# Patient Record
Sex: MALE | Race: WHITE | NOT HISPANIC OR LATINO | Employment: OTHER | URBAN - METROPOLITAN AREA
[De-identification: names, ages, dates, MRNs, and addresses within clinical notes are randomized per-mention and may not be internally consistent; named-entity substitution may affect disease eponyms.]

---

## 2018-12-04 ENCOUNTER — OFFICE VISIT CONVERTED (OUTPATIENT)
Dept: OTHER | Facility: HOSPITAL | Age: 68
End: 2018-12-04
Attending: INTERNAL MEDICINE

## 2019-06-12 ENCOUNTER — HOSPITAL ENCOUNTER (OUTPATIENT)
Dept: GENERAL RADIOLOGY | Facility: HOSPITAL | Age: 69
Discharge: HOME | End: 2019-06-12

## 2019-07-19 ENCOUNTER — OFFICE VISIT CONVERTED (OUTPATIENT)
Dept: SURGERY | Facility: CLINIC | Age: 69
End: 2019-07-19
Attending: SURGERY

## 2019-08-14 ENCOUNTER — HOSPITAL ENCOUNTER (OUTPATIENT)
Dept: GASTROENTEROLOGY | Facility: HOSPITAL | Age: 69
Setting detail: HOSPITAL OUTPATIENT SURGERY
Discharge: HOME | End: 2019-08-14
Attending: SURGERY

## 2019-09-30 ENCOUNTER — HOSPITAL ENCOUNTER (OUTPATIENT)
Dept: GENERAL RADIOLOGY | Facility: HOSPITAL | Age: 69
Discharge: HOME | End: 2019-09-30
Attending: NURSE PRACTITIONER

## 2019-09-30 LAB
CREAT BLD-MCNC: 1.1 MG/DL (ref 0.6–1.4)
GFR SERPLBLD BASED ON 1.73 SQ M-ARVRAT: >60 ML/MIN/{1.73_M2}

## 2020-02-14 ENCOUNTER — OFFICE VISIT CONVERTED (OUTPATIENT)
Dept: UROLOGY | Facility: CLINIC | Age: 70
End: 2020-02-14
Attending: UROLOGY

## 2021-04-27 ENCOUNTER — OFFICE VISIT CONVERTED (OUTPATIENT)
Dept: SURGERY | Facility: CLINIC | Age: 71
End: 2021-04-27
Attending: NURSE PRACTITIONER

## 2021-05-11 NOTE — H&P
History and Physical      Patient Name: Jake Alvarado   Patient ID: [id number]   Sex: Male   YOB: 1950    Primary Care Provider: NESTOR GALVEZ   Referring Provider: Tyra GALVEZ    Visit Date: April 27, 2021    Provider: LENNY Madrigal   Location: [facility]   Location Address: [address] [place] [address]  [id number]   Location Phone: [phone]          Chief Complaint  · Age 50 or over  · Here today for a pre-surgical colon screening visit  · Personal History of Polyps  · GERD  · Requesting EGD and Colonoscopy      History Of Present Illness  The patient is a 70 year old male presenting to the Surgical Specialist office on a referral from Tyra GALVEZ.   Jake Alvarado needs to have a diagnostic colonoscopy and EGD.   Patient states that they have had a colonoscopy. 2 years ago   Patient currently complains of: GERD   Patient Does not have family history of colon cancer.      Patient presents today on referral from José Miguel Roach for GERD and a screening colonoscopy.  Patient reports that he is with acid reflux despite taking Prilosec.  Patient reports he has been on prior Prilosec for 20 years.  He denies any dysphagia or epigastric pain.  Denies any lower abdominal pain, change in bowel habit, or rectal bleeding.  Denies family history of colorectal cancer.  Admits to history of colonic polyps.    8/19: Colonoscopy (Macario): Descending - tubular adenoma; mid-descending - tubular adenoma; Distal descending - hyperplastic; Rectum - hyperplastic; mid-descending - tubular adenoma; diverticulosis.    Patient does admit to taking Xarelto daily for A. fib and is under the care of Dr. Kammerling.  I will get cardiac clearance prior to the procedure.       Past Medical History  Disease Name Date Onset Notes   Allergic rhinitis, chronic --  --    Arthritis --  --    Asthma --  --    Bladder disorder --  --    Chronic Obstructive Pulmonary Disease  --  --    GERD --  --    Heart Disease --  --    High blood pressure --  --    High cholesterol --  --    Lung disease --  --    Stroke --  --    Ulcer --  --          Past Surgical History  Procedure Name Date Notes   Back --  --    Colon Surgery --  --          Medication List  Name Date Started Instructions   acyclovir 400 mg oral tablet  --    Advair Diskus 250-50 mcg/dose inhalation blister with device  inhale 1 puff by inhalation route 2 times per day in the morning and evening approximately 12 hours apart   Allegra Allergy 60 mg oral tablet  take 1 tablet (60 mg) by oral route 2 times per day   amiodarone 100 mg oral tablet  take 1 tablet (100 mg) by oral route once daily   aspirin 81 mg oral tablet,delayed release (DR/EC)  --    atorvastatin 40 mg oral tablet  take 1 tablet (40 mg) by oral route once daily   benazepril 40 mg oral tablet  take 1 tablet (40 mg) by oral route once daily   diltiazem HCl 180 mg oral capsule,extended release 24 hr  take 1 capsule (180 mg) by oral route once daily   omeprazole 20 mg oral capsule,delayed release(DR/EC)  --    Xarelto 20 mg oral tablet  take 1 tablet (20 mg) by oral route once daily with the evening meal         Allergy List  Allergen Name Date Reaction Notes   NO KNOWN DRUG ALLERGIES --  --  --        Allergies Reconciled  Family Medical History  Disease Name Relative/Age Notes   Diabetes, unspecified type Mother/   Mother  Mother; Brother; Sister   Prostate cancer  Uncle (maternal)   -Father's Family History Unknown Father/   Father         Social History  Finding Status Start/Stop Quantity Notes   Alcohol Current every day --/-- --  drinks daily; liquor   Caffeine Current every day --/-- --  drinks regularly; 3-4 times per day   Second hand smoke exposure Current some day --/-- --  yes   Tobacco Current every day 15/-- 1PPD current everyday smoker; started smoking at age 14; smoked 20 cigarette(s) per day 2/14/2020 - Pt is trying to stop; currently down to 2 or 3  "cig./day         Review of Systems  · Constitutional  o Denies  o : fever, chills  · Eyes  o Denies  o : yellowish discoloration of eyes  · HENT  o Denies  o : difficulty swallowing  · Cardiovascular  o Denies  o : chest pain, chest pain on exertion  · Respiratory  o Denies  o : shortness of breath  · Gastrointestinal  o Admits  o : heartburn, reflux  o Denies  o : nausea, vomiting, diarrhea, constipation  · Genitourinary  o Denies  o : abnormal color of urine  · Integument  o Denies  o : rash  · Neurologic  o Denies  o : tingling or numbness  · Musculoskeletal  o Denies  o : joint pain  · Endocrine  o Denies  o : weight gain, weight loss      Vitals  Date Time BP Position Site L\R Cuff Size HR RR TEMP (F) WT  HT  BMI kg/m2 BSA m2 O2 Sat FR L/min FiO2        04/27/2021 10:14 AM       14  172lbs 6oz 5'  5\" 28.68 1.89             Physical Examination  · Constitutional  o Appearance  o : well developed, well-nourished, patient in no apparent distress  · Head and Face  o Head  o :   § Inspection  § : atraumatic, normocephalic  o Face  o :   § Inspection  § : no facial lesions  · Eyes  o Conjunctivae  o : conjunctivae normal  o Sclerae  o : sclerae white  · Neck  o Inspection/Palpation  o : normal appearance, no masses or tenderness, trachea midline  · Respiratory  o Respiratory Effort  o : breathing unlabored  · Skin and Subcutaneous Tissue  o General Inspection  o : no lesions present, no areas of discoloration, skin turgor normal, texture normal  · Neurologic  o Mental Status Examination  o :   § Orientation  § : grossly oriented to person, place and time  § Attention  § : attention normal, concentration abilities normal  § Fund of Knowledge  § : fund of knowledge within normal limits, patient aware of current events  o Gait and Station  o : normal gait, able to stand without difficulty  · Psychiatric  o Judgement and Insight  o : judgment and insight intact  o Mood and Affect  o : mood normal, affect " appropriate              Assessment  · GERD (gastroesophageal reflux disease)     530.81/K21.9  · Personal history of colonic polyps     V12.72/Z86.010  · Screening for colon cancer     V76.51/Z12.11  · Pre-op testing     V72.84/Z01.818    Problems Reconciled  Plan  · Orders  o Consent for Colonoscopy Screening-Possible risk/complications, benefits, and alternatives to surgical or invasive procedure have been explained to patient and/or legal guardian. -Patient has been evaluated and can tolerate anesthesia and/or sedation. Risks, benefits, and alternatives to anesthesia and sedation have been explained to patient and/or legal guardian. () - 530.81/K21.9, V76.51/Z12.11, V12.72/Z86.010, V72.84/Z01.818 - 05/26/2021  o Consent for Esophagogastroduodenoscopy (EGD) with dilation - Possible risks/complications, benefits, and alternatives to surgical or invasive procedure have been explained to patient and/or legal guardian. - Patient has been evaluated and can tolerate anesthesia and/or sedation. Risks, benefits, and alternatives to anesthesia and sedation have been explained to patient and/or legal guardian. (62063) - 530.81/K21.9, V76.51/Z12.11, V12.72/Z86.010, V72.84/Z01.818 - 05/26/2021  o [facility] [place] (52363[place] - V72.84/Z01.818 - 05/21/2021   [address]  · Medications  o Medications have been Reconciled  o Transition of Care or Provider Policy  · Instructions  o Surgical Facility: [facility]  o Handouts Provided Pre-Procedure Instructions including date, time, and location of procedure.   o PLAN: Proceeed with colonoscopy. Patient understands risks/benefits and is willing to proceed.   o PLAN: Proceeed with EGD. Patient understands risks/benefits and is willing to proceed.   o ***Surgical Orders***  o RISK AND BENEFITS:  o Given these options, the patient has verbally expressed an understanding of the risks of the surgery and finds these risks acceptable. Will proceed with  surgery as soon as possible.  o O.R. PREP: Per protocol   o IV: Per Anesthesia  o Please sign permit for: Colonoscopy with possible biopsies by Dr. Macario.   o Please sign permit for: Esophagogastroduodenoscopy with possible biopsies and dilation by Dr. Macario.   o The above History and Physical Examination has been completed within 30 days of admission.  o ***Patient Status***  o Outpatient  o Follow up in the in the office post procedure.  o I have instructed the patient to hold Xarelto 2 days prior to the procedure or until cardiac clearance.  o Electronically Identified Patient Education Materials Provided Electronically  · Disposition  o EMR dragon/transcription disclaimer: Much of this encounter note is an electronic transcription/translation of spoken language to printed text. Electronic translation of spoken language may permit erroneous, or at times nonsensical words or phrases to be inadvertently trasncribed; although I have reviewed the note for such errors, some may still exist.            Electronically Signed by: LENNY Madrigal -Author on April 27, 2021 11:48:48 AM

## 2021-05-14 VITALS — BODY MASS INDEX: 28.72 KG/M2 | WEIGHT: 172.37 LBS | HEIGHT: 65 IN | RESPIRATION RATE: 14 BRPM

## 2021-05-15 VITALS — RESPIRATION RATE: 16 BRPM | BODY MASS INDEX: 27.99 KG/M2 | HEIGHT: 65 IN | WEIGHT: 168 LBS

## 2021-05-15 VITALS — HEIGHT: 65 IN | BODY MASS INDEX: 27.91 KG/M2 | RESPIRATION RATE: 14 BRPM | WEIGHT: 167.5 LBS

## 2021-05-21 ENCOUNTER — HOSPITAL ENCOUNTER (OUTPATIENT)
Dept: PREADMISSION TESTING | Facility: HOSPITAL | Age: 71
Discharge: HOME | End: 2021-05-21
Attending: SURGERY

## 2021-05-22 LAB — SARS-COV-2 RNA SPEC QL NAA+PROBE: NOT DETECTED

## 2021-05-26 ENCOUNTER — HOSPITAL ENCOUNTER (OUTPATIENT)
Dept: GASTROENTEROLOGY | Facility: HOSPITAL | Age: 71
Setting detail: HOSPITAL OUTPATIENT SURGERY
Discharge: HOME | End: 2021-05-26
Attending: SURGERY

## 2021-05-28 VITALS
SYSTOLIC BLOOD PRESSURE: 153 MMHG | DIASTOLIC BLOOD PRESSURE: 78 MMHG | HEART RATE: 96 BPM | RESPIRATION RATE: 14 BRPM | HEIGHT: 65 IN | OXYGEN SATURATION: 95 % | WEIGHT: 167.12 LBS | TEMPERATURE: 98 F | BODY MASS INDEX: 27.84 KG/M2

## 2021-05-28 NOTE — PROGRESS NOTES
Patient: DIDIER THORNE     Acct: [id number]     Report: #[id number]  UNIT #: [id number]     : 1950    Encounter Date:2018  PRIMARY CARE: Anton Aldana  ***Signed***  --------------------------------------------------------------------------------------------------------------------  Chief Complaint      Encounter Date      Dec 4, 2018            Primary Care Provider      Anton Aldana            Referring Provider      Anton Aldana            Patient Complaint      Patient is complaining of      New pt here for lung nodule            VITALS      Height 5 ft 5 in / 165.1 cm      Weight 167 lbs 2 oz / 75.41215 kg      BSA 1.83 m2      BMI 27.8 kg/m2      Temperature 98.0 F / 36.67 C - Temporal      Pulse 96      Respirations 14      Blood Pressure 153/78 Sitting, Left Arm      Pulse Oximetry 95%, Room air            HPI      The patient is a 68 year old male current every day smoker of cigarettes who was    referred for evaluation of lung nodules. He is currently smoking 1 pack per day     and has done so for 53 years. He has history of hypertension, depression,     carotid artery disease with bilateral carotid endarterectomies. He struggles     from shortness of breath and intermittent coughing and currently takes Advair t    wice daily and this seems to help some. He denies any chronic bronchitis or     recurrent pneumonias. He had a CT scan of the chest performed for low dose lung     cancer screening and was found to have noncalcified 4 mm pulmonary nodule within    the right upper lobe. There was also incidental 4 mm calcification within the     spinal canal at T8 that appeared to be closely associated with the dura     representing a dystrophic dural calcification or a calcified meningioma.             Review of Systems as noted.             Past family, medical, surgical and social histories were all reviewed by myself     with the patient and are unchanged.             Medications were reviewed by myself with the patient and updated in the chart.            ROS      Constitutional:  Denies: Fatigue, Fever, Weight gain, Weight loss, Chills,     Insomnia, Other      Respiratory/Breathing:  Complains of: Shortness of air, Cough; Denies: Wheezing,    Hemoptysis, Pleuritic pain, Other      Endocrine:  Denies: Polydipsia, Polyuria, Heat/cold intolerance, Diabetes, Other      Eyes:  Denies: Blurred vision, Vision Changes, Other      Ears, nose, mouth, throat:  Denies: Mouth lesions, Thrush, Throat pain,     Hoarseness, Allergies/Hay Fever, Post Nasal Drip, Headaches, Recent Head Injury,    Nose Bleeding, Neck Stiffness, Thyroid Mass, Hearing Loss, Ear Fullness, Dry     Mouth, Nasal or Sinus Pain, Dry Lips, Nasal discharge, Nasal congestion, Other      Cardiovascular:  Denies: Palpitations, Syncope, Claudication, Chest Pain, Wake     up Gasping for air, Leg Swelling, Irregular Heart Rate, Cyanosis, Dyspnea on     Exertion, Other      Gastrointestinal:  Denies: Nausea, Constipation, Diarrhea, Abdominal pain,     Vomiting, Difficulty Swallowing, Reflux/Heartburn, Dysphagia, Jaundice,     Bloating, Melena, Bloody stools, Other      Genitourinary:  Denies: Urinary frequency, Incontinence, Hematuria, Urgency,     Nocturia, Dysuria, Testicular problems, Other      Musculoskeletal:  Denies: Joint Pain, Joint Stiffness, Joint Swelling, Myalgias,    Other      Hematologic/lymphatic:  DENIES: Lymphadenopathy, Bruising, Bleeding tendencies,     Other      Neurological:  Denies: Headache, Numbness, Weakness, Seizures, Other      Psychiatric:  Denies: Anxiety, Appropriate Effect, Depression, Other      Sleep:  No: Excessive daytime sleep, Morning Headache?, Snoring, Insomnia?, Stop    breathing at sleep?, Other      Integumentary:  Denies: Rash, Dry skin, Skin Warm to Touch, Other      Immunologic/Allergic:  Denies: Latex allergy, Seasonal allergies, Asthma,     Urticaria, Eczema, Other       Immunization status:  No: Up to date            FAMILY/SOCIAL/MEDICAL HX      Surgical History:  Yes: Carotid Stenosis (CAROTID ENDARDERECTOMY RIGHT),     Orthopedic Surgery (NECK), Spinal Surgery (NECK), Vascular Surgery (right     carotid)      Stroke - Family Hx:  Uncle      Heart - Family Hx:  Mother, Grandparent, Uncle      Diabetes - Family Hx:  Mother, Sister      Cancer/Type - Family Hx:  Other      Other Family Medical History:  Other      Is Father Still Living?:  No      Is Mother Still Living?:  No      Smoking status:  Current every day smoker (1 ppd x 53 years)      Anticoagulation Therapy:  No      Antibiotic Prophylaxis:  No      Medical History:  Yes: Arthritis, Cataracts, Depression, Hemorrhoids/Rectal Prob    (REFLUX), High Blood Pressure, High Cholesterol, Reflux Disease, Stroke; No:     Blood Disease, Chemotherapy/Cancer, Shortness Of Breath      Psychiatric History      Depression            PREVENTION      Hx Influenza Vaccination:  Yes      Date Influenza Vaccine Given:  Nov 1, 2018      Influenza Vaccine Declined:  No      2 or More Falls Past Year?:  No      Fall Past Year with Injury?:  No      Hx Pneumococcal Vaccination:  No      Encouraged to follow-up with:  PCP regarding preventative exams.      Chart initiated by      Mary Nelson MA            ALLERGIES/MEDICATIONS      Allergies:        Coded Allergies:             *No Known Allergies (Verified  Allergy, Unknown, 12/3/18)      Medications    Last Reconciled on 12/4/18 16:35 by AMNA KHAN DO      Tolterodine Tartrate (Detrol LA) 2 Mg Cap.er.24h      2 MG PO QDAY, CAP         Reported         12/4/18       Doxazosin Mesylate (Doxazosin Mesylate) 8 Mg Tablet      8 MG PO QDAY, TAB         Reported         12/4/18       MDI-Advair 250/50 (Advair 250/50 Diskus) 1 Each Blst.w.dev      1 PUFF INH RTBID, #1 INH 0 Refills         Reported         12/4/18       (advair)   No Conflict Check               Prov: AMNA KHAN          12/4/18       Pravastatin Sod (Pravastatin*) 40 Mg Tablet      40 MG PO QDAY, #30 TAB 0 Refills         Reported         12/4/18       Amlodipine/Benazepril Hcl (Amlodipine/Benazepril 5/40 Mg) 1 Each Capsule      1 CAP PO QDAY, #30 CAP         Reported         12/4/18       Omeprazole (PriLOSEC*) 20 Mg Capcr               Reported         4/23/07       Aspirin (Aspirin Low-Strength 81 Mg) 81 Mg Tab               Reported         4/23/07       Acyclovir (Zovirax) 400 Mg Tab               Reported         4/23/07      Current Medications      Current Medications Reviewed 12/3/18            EXAM      Vital signs reviewed.      HEENT: Pupils are equally round and reactive to light and accommodation.      Extraocular muscles intact bilateral. Nares patent without hypertrophy of the     turbinates.  TM's are clear bilaterally. Small oropharynx without lesions or     erythema. There are dentures in place.       NECK:  Supple without tracheal deviation or lymphadenopathy. No thyromegaly     appreciated.       LYMPHATICS: No cervical or supraclavicular lymphadenopathy.       RESPIRATORY: Bibasilar rhonchi, no wheezes or rales, tympanic to percussion.      CVS:  Regular rate and rhythm, no murmurs, rubs or gallops, no lower extremity     edema, , equal radial pulses.      GI: Abdomen soft, nontender, nondistended, no hepatomegaly appreciated.  Bowel     sounds present in all 4 quadrants.       MUSCULOSKELETAL: No erythema, warmth or fluctuance over the major joints     including the knees, ankles, wrists and elbows.        SKIN: No rashes or lesions.       NEUROLOGICAL: Alert and oriented X 3.  No focal deficits on exam. Cranial nerves     II-XII intact bilaterally.       PSYCH: Patient has appropriate mood and affect.      Vtials      Vitals:             Height 5 ft 5 in / 165.1 cm           Weight 167 lbs 2 oz / 75.15417 kg           BSA 1.83 m2           BMI 27.8 kg/m2           Temperature 98.0 F / 36.67 C - Temporal            Pulse 96           Respirations 14           Blood Pressure 153/78 Sitting, Left Arm           Pulse Oximetry 95%, Room air            REVIEW      Results Reviewed      PCCS Results Reviewed?:  Yes Prev Radiology Results      Radiographic Results               [facility] Diagnostic Img                PACS RADIOLOGY REPORT            Patient: DIDIER THORNE   Acct: #[id number]   Report: #[id number]            UNIT #: [id number]    DOS: 18 1015      INSURANCE:MEDICARE PART A   LOCATION:[facility]     : 1950            PROVIDERS      ADMITTING:     ATTENDING: NESTOR HENDERSON      FAMILY:  NONE,MD   ORDERING:  NESTOR HENDERSON         OTHER:    DICTATING:  Ta Robles MD            REQ #:[id number]   EXAM:LDCTCH - LOW DOSE CHEST CT SCREENING      REASON FOR EXAM:        REASON FOR VISIT:  NICOTINE DEP            *******Signed******         PROCEDURE:   CT LOW DOSE CHEST SCREENING             COMPARISON:   [facility], MR, THORACIC SPINE W/O CONT., 2005,     9:30.             REASON FOR SCREENING:   Patient is 68 years of age, asymptomatic, and has a smok    ing history of more       than 30 pack years.      SMOKING STATUS:   Current smoker.                  SCREENING VISIT:   Baseline.                   TECHNIQUE:   Axial unenhanced LDCT images from the apices through mid-kidney     were obtained.       Evaluation of solid organs and vascular structures is suboptimal due to the lack     of IV contrast.       Imaging was performed on a Reza AngelPrime CT scanner.             RADIATION:   CT Dose Index Vol (CTDIvol):    3.085  mGy         Dose Length Product (DLP):    132.2  mGy-cm             DIAGNOSTIC QUALITY:   Satisfactory.               FINDINGS:         There is severe Coronary artery calcification.  There is abberant origin of the     right subclavian       artery seen as an anatomic variant.  There is no mediastinal, hilar,  or axillary     adenopathy.  There       are no pleural effusions.  There is biapical pleural and parenchymal scarring.      Within the right       upper lobe axial image 57 there is a noncalcified 4 mm pulmonary nodule.  No     additional       noncalcified pulmonary nodules are seen.  There is emphysematous change of the     lungs.  There is       minimal linear atelectasis or scarring within the left lower lobe.  Bilateral     adrenal glands are       within normal limits.  Visualized portions of the upper abdomen are otherwise     unremarkable.  There       are degenerative changes of the spine.  No lytic or sclerotic bony lesion     identified.  At the T8       level there is a 4 mm calcification within the spinal canal.  This lies to the     right of midline and       appears to be within or along the wall of the dura.  This could represent a     dystrophic dural       calcification.  Small partially calcified meningioma would also be within the     differential.             There are degenerative changes of the spine.  No lytic or sclerotic bony lesion     identified.      LUNG-RADS CLASSIFICATION:   2-benign appearance are behavior, repeat low-dose     chest CT is recommended       in 12 months?             CONCLUSION:         1. Noncalcified 4 mm pulmonary nodule within the right upper lobe, recommend     repeat low-dose chest       CT in 12 months      2. Incidental 4 mm calcification within the spinal canal at the T8 level.  This     appears to be       closely associated with the dura and may represent a dystrophic dural     calcification or calcified       meningioma.              JALEEL CHASE MD             Electronically Signed and Approved By: JALEEL CHASE MD on 11/13/2018 at 10:52                            Until signed, this is an unconfirmed preliminary report that may contain      errors and is subject to change.                                              STEBA:      D:11/13/18 1052             Assessment      Notes      New Medications      * AMLODIPINE/BENAZEPRIL HCL (Amlodipine/Benazepril 5/40 Mg) 1 EACH CAPSULE: 1       CAP PO QDAY #30      * Pravastatin Sod (Pravastatin*) 40 MG TABLET: 40 MG PO QDAY #30      * (advair):       * MDI-Advair 250/50 (Advair 250/50 Diskus) 1 EACH BLST.W.DEV: 1 PUFF INH RTBID       #1      * Doxazosin Mesylate 8 MG TABLET: 8 MG PO QDAY      * TOLTERODINE TARTRATE (Detrol LA) 2 MG CAP.ER.24H: 2 MG PO QDAY      New Diagnostics      * Chest W/O Cont CT, Year      ASSESSMENT:      1. 4 mm pulmonary nodule right upper lobe.      2. Ongoing tobacco abuse with cigarettes.       3. Dyspnea on exertion and suspect chronic obstructive pulmonary disease.             PLAN:      1.  I counseled the patient on smoking cessation for approximately 3 minutes     today. The patient is currently not willing to try to quit smoking. He says he     enjoys it.       2. I offered pulmonary function tests and six minute walk test to further assess     the patient for chronic obstructive pulmonary disease and he declined.       3.  Repeat CT scan of the chest low dose in 1 year per Fleischner criteria.      4. Continue Advair.      5. Follow up with primary care provider.            Patient Education      Tobacco Cessation Counseling:  for 3 - 10 minutes      Patient Education Provided:  COPD, How to use an Inhaler, Lung Cancer, Smoking     Cessation      Time Spent:  > 50% /Coord [facility]                 Disclaimer: Converted document may not contain table formatting or lab diagrams. Please see [employer] for the authenticated document.

## 2021-06-11 ENCOUNTER — OFFICE VISIT (OUTPATIENT)
Dept: UROLOGY | Facility: CLINIC | Age: 71
End: 2021-06-11

## 2021-06-11 VITALS — RESPIRATION RATE: 17 BRPM | HEIGHT: 65 IN | WEIGHT: 169.6 LBS | BODY MASS INDEX: 28.26 KG/M2

## 2021-06-11 DIAGNOSIS — R35.0 URINARY FREQUENCY: Primary | ICD-10-CM

## 2021-06-11 DIAGNOSIS — R35.0 BENIGN PROSTATIC HYPERPLASIA WITH URINARY FREQUENCY: ICD-10-CM

## 2021-06-11 DIAGNOSIS — R31.9 HEMATURIA, UNSPECIFIED TYPE: ICD-10-CM

## 2021-06-11 DIAGNOSIS — N40.1 BENIGN PROSTATIC HYPERPLASIA WITH URINARY FREQUENCY: ICD-10-CM

## 2021-06-11 LAB
BILIRUB BLD-MCNC: NEGATIVE MG/DL
BILIRUB UR QL STRIP: NEGATIVE
CLARITY UR: CLEAR
CLARITY, POC: CLEAR
COLOR UR: YELLOW
COLOR UR: YELLOW
GLUCOSE UR STRIP-MCNC: NEGATIVE MG/DL
GLUCOSE UR STRIP-MCNC: NEGATIVE MG/DL
HGB UR QL STRIP.AUTO: ABNORMAL
KETONES UR QL STRIP: NEGATIVE
KETONES UR QL: NEGATIVE
LEUKOCYTE EST, POC: NEGATIVE
LEUKOCYTE ESTERASE UR QL STRIP.AUTO: NEGATIVE
NITRITE UR QL STRIP: NEGATIVE
NITRITE UR-MCNC: NEGATIVE MG/ML
PH UR STRIP.AUTO: 6 [PH] (ref 5–8)
PH UR: 6 [PH] (ref 5–8)
PROT UR QL STRIP: NEGATIVE
PROT UR STRIP-MCNC: NEGATIVE MG/DL
RBC # UR STRIP: ABNORMAL /UL
SP GR UR STRIP: 1.01 (ref 1–1.03)
SP GR UR: 1.02 (ref 1–1.03)
SPECIMEN VOL 24H UR: 144 L
UROBILINOGEN UR QL STRIP: ABNORMAL
UROBILINOGEN UR QL: NORMAL

## 2021-06-11 PROCEDURE — 99214 OFFICE O/P EST MOD 30 MIN: CPT | Performed by: UROLOGY

## 2021-06-11 PROCEDURE — 81002 URINALYSIS NONAUTO W/O SCOPE: CPT | Performed by: UROLOGY

## 2021-06-11 PROCEDURE — 81003 URINALYSIS AUTO W/O SCOPE: CPT | Performed by: UROLOGY

## 2021-06-11 RX ORDER — AMIODARONE HYDROCHLORIDE 200 MG/1
100 TABLET ORAL DAILY
COMMUNITY
Start: 2021-04-06

## 2021-06-11 RX ORDER — ASPIRIN 81 MG/1
81 TABLET, COATED ORAL DAILY
COMMUNITY
Start: 2021-06-10

## 2021-06-11 RX ORDER — DILTIAZEM HYDROCHLORIDE 180 MG/1
180 CAPSULE, COATED, EXTENDED RELEASE ORAL DAILY
COMMUNITY
Start: 2021-04-06

## 2021-06-11 RX ORDER — RIVAROXABAN 20 MG/1
20 TABLET, FILM COATED ORAL DAILY
COMMUNITY
Start: 2021-06-10

## 2021-06-11 RX ORDER — ACYCLOVIR 400 MG/1
400 TABLET ORAL NIGHTLY
COMMUNITY
Start: 2021-05-07

## 2021-06-11 RX ORDER — BENAZEPRIL HYDROCHLORIDE 40 MG/1
40 TABLET, FILM COATED ORAL DAILY
COMMUNITY
Start: 2021-04-06

## 2021-06-11 RX ORDER — FEXOFENADINE HYDROCHLORIDE 60 MG/1
60 TABLET, FILM COATED ORAL DAILY
COMMUNITY

## 2021-06-11 RX ORDER — ATORVASTATIN CALCIUM 40 MG/1
40 TABLET, FILM COATED ORAL NIGHTLY
COMMUNITY
Start: 2021-04-06

## 2021-06-11 NOTE — PROGRESS NOTES
Chief Complaint    Urologic complaint    Subjective          Jake Alvarado presents to [facility] UROLOGY  History of Present Illness       70 yo WM with possible BPH, LUTS and freq    Patient stopped doxazosin, he was having bad side effects.  Could not really tell any difference when he came off his medication.    weak stream.  Patient still having a lot of frequency, 12-14 times daily and 1-3 times at night.  Voiding about every hour today time.  No incontinence, no pads. Bothered.    3 to 4 cups of caffeinated coffee daily.  Patient does drink a lot of fluid daily, he also drinks some alcohol in the evening.  No gross hematuria    PVR     6/21    144  2/ 20   70    Results for orders placed or performed in visit on 06/11/21   Bladder Scan   Result Value Ref Range    Volume 144    POC Urinalysis Dipstick    Specimen: Urine   Result Value Ref Range    Color Yellow Yellow, Straw, Dark Yellow, Martine    Clarity, UA Clear Clear    Glucose, UA Negative Negative, 1000 mg/dL (3+) mg/dL    Bilirubin Negative Negative    Ketones, UA Negative Negative    Specific Gravity  1.020 1.005 - 1.030    Blood, UA Large (A) Negative    pH, Urine 6.0 5.0 - 8.0    Protein, POC Negative Negative mg/dL    Urobilinogen, UA Normal Normal    Leukocytes Negative Negative    Nitrite, UA Negative Negative       Previous    Flomax - did not help  tolteradine - did not help    No history of kidney stone.    uncle with prostate CA,   Has never had any urologic surgery.    A fib. No CAD,  smokes.  Xarelto and aspirin 81.  Left pulmonary he is recently diagnosed with cryptogenic pulmonary pneumonia    12/19 creatinine 1.0, GFR greater than 60    PSA      11/18 1.3  5/17 1.0    Past History:  Medical History: has a past medical history of Arthritis, Asthma, Bladder disorder, Chronic allergic rhinitis, Condition not found, COPD (chronic obstructive pulmonary disease) (CMS/[facility]), GERD (gastroesophageal reflux disease), Heart  disease, High blood pressure, High cholesterol, Lung disease, and Stroke (CMS/HCC).   Surgical History: has a past surgical history that includes Back surgery and Colon surgery.   Family History: family history includes Diabetes in his brother, mother, and sister; No Known Problems in his father; Prostate cancer in his maternal uncle.   Social History: reports that he has been smoking. He started smoking about 56 years ago. He has been smoking about 1.00 pack per day. He has never used smokeless tobacco. He reports current alcohol use.  Allergies: Patient has no known allergies.       Current Outpatient Medications:   •  acyclovir (ZOVIRAX) 400 MG tablet, , Disp: , Rfl:   •  amiodarone (PACERONE) 200 MG tablet, , Disp: , Rfl:   •  Aspirin Low Dose 81 MG EC tablet, , Disp: , Rfl:   •  atorvastatin (LIPITOR) 40 MG tablet, , Disp: , Rfl:   •  benazepril (LOTENSIN) 40 MG tablet, , Disp: , Rfl:   •  dilTIAZem CD (CARDIZEM CD) 180 MG 24 hr capsule, , Disp: , Rfl:   •  fexofenadine (Allegra Allergy) 60 MG tablet, Allegra Allergy 60 mg oral tablet take 1 tablet (60 mg) by oral route 2 times per day   Active, Disp: , Rfl:   •  Xarelto 20 MG tablet, , Disp: , Rfl:      Physical exam       Alert and orient x3  Well appearing, well developed, in no acute distress   Unlabored respirations    Grossly oriented to person, place and time, judgment is intact, normal mood and affect    Results for orders placed or performed in visit on 06/11/21   Bladder Scan   Result Value Ref Range    Volume 144    POC Urinalysis Dipstick    Specimen: Urine   Result Value Ref Range    Color Yellow Yellow, Straw, Dark Yellow, Martine    Clarity, UA Clear Clear    Glucose, UA Negative Negative, 1000 mg/dL (3+) mg/dL    Bilirubin Negative Negative    Ketones, UA Negative Negative    Specific Gravity  1.020 1.005 - 1.030    Blood, UA Large (A) Negative    pH, Urine 6.0 5.0 - 8.0    Protein, POC Negative Negative mg/dL    Urobilinogen, UA Normal Normal     "Leukocytes Negative Negative    Nitrite, UA Negative Negative        Objective     Vital Signs:   Resp 17   Ht 165.1 cm (65\")   Wt 76.9 kg (169 lb 9.6 oz)   BMI 28.22 kg/m²              Assessment and Plan    Diagnoses and all orders for this visit:    1. Urinary frequency (Primary)  -     POC Urinalysis Dipstick    2. Benign prostatic hyperplasia with urinary frequency    Other orders  -     Bladder Scan    Acquired recent labs from PCP including PSA    Patient with urinary symptoms that have not gotten better he is also positive for large blood on UA today.  I did recommend office cystoscopy.  I will get him in for this in the next month or so.  Risk/benefits and side effect discussed today    UA with micro today, discussed with the patient that if he does have microscopic hematuria documented on this I would call him and discuss getting a CT scan to rule out underlying malignancy.  Patient voiced understanding    Patient is drinking a lot of fluid and some of which is caffeine/alcohol we discussed decreasing this may get his symptoms where he may not have anything done.  Patient voiced understanding when to try to decrease his fluid intake and decrease caffeine over the next month or so.    Follow-up for office cystoscopy      "

## 2021-08-20 PROBLEM — R31.29 MICROHEMATURIA: Status: ACTIVE | Noted: 2021-08-20

## 2021-08-20 NOTE — PROGRESS NOTES
Procedures       Urinalysis was checked today and was negative for signs of infection      Cytoscopy Procedure:     Procedure: Flexible cytoscope was passed per urethra into the bladder without difficulty after proper consent. The bladder was inspected in a systematic meridian fashion.     2.5 cm prostate    Moderate trabeculations with some very shallow bordering on diverticulum posteriorly in the dome    There were no tumors, lesions, stones, or other abnormalities noted within the bladder. Of note, there was no increased vascularity as well. Both ureteral orifices were identified and were normal in appearance. The flexible cytoscope was removed. The patient tolerated the procedure well.         External records reviewed    3/21 creatinine 1.24, GFR 58, PSA 1.1        PLAN      BPH    Cannot tolerate meds.  Patient on Xarelto and aspirin 81  When he comes back and we will see how he is doing we could consider procedures or following him conservatively.    Microhematuria      After discussion of risk and benefits we will Get him set up for CT urology protocol having follow-up after            This document has been electronically signed by Mervin Leroy MD  August 20, 2021 06:56 EDT

## 2021-08-23 ENCOUNTER — OFFICE VISIT (OUTPATIENT)
Dept: UROLOGY | Facility: CLINIC | Age: 71
End: 2021-08-23

## 2021-08-23 DIAGNOSIS — R31.29 MICROHEMATURIA: ICD-10-CM

## 2021-08-23 DIAGNOSIS — N40.1 BENIGN PROSTATIC HYPERPLASIA WITH URINARY FREQUENCY: Primary | ICD-10-CM

## 2021-08-23 DIAGNOSIS — R35.0 BENIGN PROSTATIC HYPERPLASIA WITH URINARY FREQUENCY: Primary | ICD-10-CM

## 2021-08-23 PROCEDURE — 52000 CYSTOURETHROSCOPY: CPT | Performed by: UROLOGY

## 2021-10-01 ENCOUNTER — HOSPITAL ENCOUNTER (OUTPATIENT)
Dept: CT IMAGING | Facility: HOSPITAL | Age: 71
Discharge: HOME | End: 2021-10-01
Admitting: UROLOGY

## 2021-10-01 DIAGNOSIS — R35.0 BENIGN PROSTATIC HYPERPLASIA WITH URINARY FREQUENCY: ICD-10-CM

## 2021-10-01 DIAGNOSIS — R31.29 MICROHEMATURIA: ICD-10-CM

## 2021-10-01 DIAGNOSIS — N40.1 BENIGN PROSTATIC HYPERPLASIA WITH URINARY FREQUENCY: ICD-10-CM

## 2021-10-01 LAB — CREAT BLDA-MCNC: 1.3 MG/DL

## 2021-10-01 PROCEDURE — 82565 ASSAY OF CREATININE: CPT

## 2021-10-01 PROCEDURE — 74178 CT ABD&PLV WO CNTR FLWD CNTR: CPT

## 2021-10-01 PROCEDURE — 0 IOPAMIDOL PER 1 ML: Performed by: UROLOGY

## 2021-10-01 RX ADMIN — IOPAMIDOL 100 ML: 755 INJECTION, SOLUTION INTRAVENOUS at 10:39

## 2021-10-05 NOTE — PROGRESS NOTES
Chief Complaint    Urologic complaint    Subjective          Jake Alvarado presents to [facility] UROLOGY  History of Present Illness       70 yo WM with possible BPH, LUTS and freq/microhematuria    10/1/2021 CT urogram-prostatomegaly, diverticulosis, recommend colonoscopy, no  findings, severe arthrosclerosis in the abdominal aorta and iliac vascular distributions.  AAA measuring 3 cm in transverse dimension.  Delayed phase okay    8/21 cystoscopy-2.5 cm prostate, moderate trabeculations with some very shallow diverticulum posteriorly on the dome.  Negative otherwise.    Patient has had recent colonoscopy    weak stream at times.  Patient still having a lot of frequency, 12-14 times daily and 1-3 times at night.  Voiding about every hour today time.  No incontinence, no pads. Bothered.    Previous    doxazosin -   Could not really tell any difference when he came off his medication. Had side effects.    3 to 4 cups of caffeinated coffee daily.  Patient does drink a lot of fluid daily, he also drinks some alcohol in the evening.      PVR     6/21    144  2/ 20   70    Results for orders placed or performed during the hospital encounter of 10/01/21   POC Creatinine    Specimen: Blood   Result Value Ref Range    Creatinine 1.30 mg/dL    GFR MDRD       GFR MDRD Non African American 54 mL/min/1.73 sq.M       Flomax - did not help  tolteradine - did not help    No history of kidney stone.    uncle with prostate CA,   Has never had any urologic surgery.    A fib. No CAD,  smokes.  Xarelto and aspirin 81.  Left pulmonary he is recently diagnosed with cryptogenic pulmonary pneumonia    12/19 creatinine 1.0, GFR greater than 60    PSA    3/21  1.1  11/18 1.3  5/17 1.0    Past History:  Medical History: has a past medical history of Arthritis, Asthma, Bladder disorder, Chronic allergic rhinitis, Condition not found, COPD (chronic obstructive pulmonary disease) (CMS/[facility]), GERD  (gastroesophageal reflux disease), Heart disease, High blood pressure, High cholesterol, Lung disease, and Stroke (CMS/HCC).   Surgical History: has a past surgical history that includes Back surgery and Colon surgery.   Family History: family history includes Diabetes in his brother, mother, and sister; No Known Problems in his father; Prostate cancer in his maternal uncle.   Social History: reports that he has been smoking. He started smoking about 56 years ago. He has been smoking about 1.00 pack per day. He has never used smokeless tobacco. He reports current alcohol use.  Allergies: Patient has no known allergies.       Current Outpatient Medications:   •  acyclovir (ZOVIRAX) 400 MG tablet, , Disp: , Rfl:   •  amiodarone (PACERONE) 200 MG tablet, , Disp: , Rfl:   •  Aspirin Low Dose 81 MG EC tablet, , Disp: , Rfl:   •  atorvastatin (LIPITOR) 40 MG tablet, , Disp: , Rfl:   •  benazepril (LOTENSIN) 40 MG tablet, , Disp: , Rfl:   •  dilTIAZem CD (CARDIZEM CD) 180 MG 24 hr capsule, , Disp: , Rfl:   •  fexofenadine (Allegra Allergy) 60 MG tablet, Allegra Allergy 60 mg oral tablet take 1 tablet (60 mg) by oral route 2 times per day   Active, Disp: , Rfl:   •  Xarelto 20 MG tablet, , Disp: , Rfl:      Physical exam       Alert and orient x3  Well appearing, well developed, in no acute distress   Unlabored respirations    Grossly oriented to person, place and time, judgment is intact, normal mood and affect    Results for orders placed or performed during the hospital encounter of 10/01/21   POC Creatinine    Specimen: Blood   Result Value Ref Range    Creatinine 1.30 mg/dL    GFR MDRD       GFR MDRD Non African American 54 mL/min/1.73 sq.M        Objective     Vital Signs:   There were no vitals taken for this visit.             Assessment and Plan    Diagnoses and all orders for this visit:    1. Microhematuria (Primary)    2. Benign prostatic hyperplasia with urinary frequency    BPH     Patient has  had trouble with alpha blockers, after discussion of risk and benefits we will place him on finasteride 5 mg daily.    Patient is on Xarelto, we discussed this with risk and benefits of TURP.  At this time he would like to hold off on any procedures       Microhematuria    Hematuria work-up negative.  Patient given reassurance.

## 2021-10-08 ENCOUNTER — OFFICE VISIT (OUTPATIENT)
Dept: UROLOGY | Facility: CLINIC | Age: 71
End: 2021-10-08

## 2021-10-08 VITALS — WEIGHT: 169 LBS | HEIGHT: 65 IN | RESPIRATION RATE: 17 BRPM | BODY MASS INDEX: 28.16 KG/M2

## 2021-10-08 DIAGNOSIS — R31.29 MICROHEMATURIA: Primary | ICD-10-CM

## 2021-10-08 DIAGNOSIS — N40.1 BENIGN PROSTATIC HYPERPLASIA WITH URINARY FREQUENCY: ICD-10-CM

## 2021-10-08 DIAGNOSIS — R35.0 BENIGN PROSTATIC HYPERPLASIA WITH URINARY FREQUENCY: ICD-10-CM

## 2021-10-08 PROCEDURE — 99214 OFFICE O/P EST MOD 30 MIN: CPT | Performed by: UROLOGY

## 2021-10-08 RX ORDER — FINASTERIDE 5 MG/1
5 TABLET, FILM COATED ORAL DAILY
Qty: 90 TABLET | Refills: 4 | Status: SHIPPED | OUTPATIENT
Start: 2021-10-08 | End: 2023-01-04

## 2022-08-11 ENCOUNTER — TELEPHONE (OUTPATIENT)
Dept: NEUROLOGY | Facility: CLINIC | Age: 72
End: 2022-08-11

## 2022-08-11 NOTE — TELEPHONE ENCOUNTER
Josselin from the [facility] called and is requesting a 7 day hold on patient's aspirin for surgery.  Please call her back [phone] thank you

## 2022-10-17 ENCOUNTER — OFFICE VISIT (OUTPATIENT)
Dept: SURGERY | Facility: CLINIC | Age: 72
End: 2022-10-17

## 2022-10-17 ENCOUNTER — PREP FOR SURGERY (OUTPATIENT)
Dept: OTHER | Facility: HOSPITAL | Age: 72
End: 2022-10-17

## 2022-10-17 VITALS — BODY MASS INDEX: 29.52 KG/M2 | HEIGHT: 65 IN | WEIGHT: 177.2 LBS | RESPIRATION RATE: 16 BRPM

## 2022-10-17 DIAGNOSIS — Z86.010 PERSONAL HISTORY OF COLONIC POLYPS: Primary | ICD-10-CM

## 2022-10-17 PROBLEM — Z86.0100 PERSONAL HISTORY OF COLONIC POLYPS: Status: ACTIVE | Noted: 2022-10-17

## 2022-10-17 PROCEDURE — S0260 H&P FOR SURGERY: HCPCS | Performed by: SURGERY

## 2022-10-17 RX ORDER — HYDRALAZINE HYDROCHLORIDE 50 MG/1
75 TABLET, FILM COATED ORAL
COMMUNITY

## 2022-10-17 RX ORDER — FLUTICASONE PROPIONATE 50 MCG
2 SPRAY, SUSPENSION (ML) NASAL DAILY
COMMUNITY
End: 2023-01-04

## 2022-10-17 RX ORDER — DICYCLOMINE HYDROCHLORIDE 10 MG/1
10 CAPSULE ORAL 2 TIMES DAILY
COMMUNITY

## 2022-10-17 RX ORDER — OMEPRAZOLE 20 MG/1
20 CAPSULE, DELAYED RELEASE ORAL 2 TIMES DAILY
COMMUNITY

## 2022-10-17 NOTE — PROGRESS NOTES
Chief Complaint:  Colonoscopy (Consult)    Primary Care Provider: Philomena Copeland APRN    Referring Provider:  Self referral    History of Present Illness  Jake Alvarado is a 72 y.o. male who was on a callback list to have a colonoscopy.  The patient had a colonoscopy by me on 5/26/21 for surveillance after the patient had a colonoscopy in 2019 and 5 polyps were removed.  During the colonoscopy in May 2021, I removed tubular adenoma that was about 20 to 25 mm size and located 20 cm from the anus.  The colon was tattooed just distal to the polyp and one Endo Clip was placed.  Patient was instructed to get a colonoscopy one year later and he is here now to get that arranged.  Patient takes Xarelto for atrial fibrillation.  He has stopped taking it before to have procedures performed.    Allergies: Patient has no known allergies.    Outpatient Medications Marked as Taking for the 10/17/22 encounter (Office Visit) with Chuck Macario MD   Medication Sig Dispense Refill   • acyclovir (ZOVIRAX) 400 MG tablet      • amiodarone (PACERONE) 200 MG tablet      • Aspirin Low Dose 81 MG EC tablet      • atorvastatin (LIPITOR) 40 MG tablet      • benazepril (LOTENSIN) 40 MG tablet      • dicyclomine (BENTYL) 10 MG capsule Take 1 capsule by mouth 3 (Three) Times a Day.     • dilTIAZem CD (CARDIZEM CD) 180 MG 24 hr capsule      • fexofenadine (ALLEGRA) 60 MG tablet Allegra Allergy 60 mg oral tablet take 1 tablet (60 mg) by oral route 2 times per day   Active     • finasteride (PROSCAR) 5 MG tablet Take 1 tablet by mouth Daily. 90 tablet 4   • fluticasone (FLONASE) 50 MCG/ACT nasal spray 2 sprays into the nostril(s) as directed by provider Daily.     • Fluticasone-Umeclidin-Vilant (Trelegy Ellipta) 100-62.5-25 MCG/INH inhaler Inhale 1 puff Daily.     • hydrALAZINE (APRESOLINE) 50 MG tablet Take 1.5 tablets by mouth 3 (Three) Times a Day.     • ipratropium-albuterol (COMBIVENT RESPIMAT)  MCG/ACT inhaler Inhale 1  "puff 4 (Four) Times a Day As Needed for Wheezing.     • omeprazole (priLOSEC) 20 MG capsule Take 1 capsule by mouth 2 (Two) Times a Day.     • Xarelto 20 MG tablet          Past Medical History:   • Arthritis   • Asthma   • Bladder disorder   • Chronic allergic rhinitis   • Condition not found    Ulcer   • COPD (chronic obstructive pulmonary disease) ([facility])   • GERD (gastroesophageal reflux disease)   • Heart disease   • High blood pressure   • High cholesterol   • Lung disease   • Stroke (HCC)        Past Surgical History:   • BACK SURGERY   • COLON SURGERY       Family History:   Family History   Problem Relation Age of Onset   • Diabetes Mother    • No Known Problems Father    • Diabetes Sister    • Diabetes Brother    • Prostate cancer Maternal Uncle         Social History:  Social History     Tobacco Use   • Smoking status: Every Day     Packs/day: 1.00     Types: Cigarettes     Start date: 1965   • Smokeless tobacco: Never   • Tobacco comments:     Pt is trying to stop; currently down to 2 or 3 cig./day   Substance Use Topics   • Alcohol use: Yes     Comment: Current every day  drinks daily; liqour       Objective     Vital Signs:  Resp 16   Ht 165.1 cm (65\")   Wt 80.4 kg (177 lb 3.2 oz)   BMI 29.49 kg/m²   • Constitutional: alert, no acute distress, reliable historian  • HENT:  NCAT, no visible deformities or lesions  • Eyes:  sclerae clear, conjunctivae clear, EOMI  • Neck:  normal appearance, no masses, trachea midline  • Respiratory:  breathing not labored, respiratory effort appears normal  • Cardiovascular:  heart regular rate  • Abdomen:  nondistended    • Skin and subcutaneous tissue:  no visible concerning rashes or lesions, no jaundice  • Musculoskeletal: moving all extremities symmetrically and purposefully  • Neurologic:  no obvious motor or sensory deficits, normal gait, able to stand without difficulty, cerebellar function without any obvious abnormalities, alert & oriented x 3, speech " clear  • Psychiatric:  judgment and insight intact, mood normal, affect appropriate, cooperative      Assessment:  Personal history of colonic polyps    Plan:  Colonoscopy    Discussion: Indications, options, risks, benefits, and expected outcomes of planned surgery were discussed with the patient and he agrees to proceed.  Patient was instructed to stop taking Xarelto 2 days before the day he has the colonoscopy.    Chuck Macario MD  10/17/2022    Electronically signed by Chuck Macario MD, 10/17/22, 10:39 AM EDT.

## 2022-10-26 ENCOUNTER — TELEPHONE (OUTPATIENT)
Dept: UROLOGY | Facility: CLINIC | Age: 72
End: 2022-10-26

## 2022-10-26 NOTE — TELEPHONE ENCOUNTER
Patient agreed to moving his appt from 10/28 to 12/02 at 1000. He said he is doing well and does not need any refills.

## 2022-11-30 PROBLEM — N40.1 BENIGN PROSTATIC HYPERPLASIA WITH LOWER URINARY TRACT SYMPTOMS: Status: ACTIVE | Noted: 2022-11-30

## 2022-12-02 ENCOUNTER — OFFICE VISIT (OUTPATIENT)
Dept: UROLOGY | Facility: CLINIC | Age: 72
End: 2022-12-02

## 2022-12-02 VITALS — RESPIRATION RATE: 18 BRPM

## 2022-12-02 DIAGNOSIS — R31.29 MICROHEMATURIA: ICD-10-CM

## 2022-12-02 DIAGNOSIS — N40.1 BENIGN PROSTATIC HYPERPLASIA WITH LOWER URINARY TRACT SYMPTOMS, SYMPTOM DETAILS UNSPECIFIED: Primary | ICD-10-CM

## 2022-12-02 LAB
BACTERIA UR QL AUTO: NORMAL /HPF
BILIRUB BLD-MCNC: NEGATIVE MG/DL
BILIRUB UR QL STRIP: NEGATIVE
CLARITY UR: CLEAR
CLARITY, POC: CLEAR
COLOR UR: YELLOW
COLOR UR: YELLOW
EXPIRATION DATE: ABNORMAL
GLUCOSE UR STRIP-MCNC: NEGATIVE MG/DL
GLUCOSE UR STRIP-MCNC: NEGATIVE MG/DL
HGB UR QL STRIP.AUTO: NEGATIVE
HYALINE CASTS UR QL AUTO: NORMAL /LPF
KETONES UR QL STRIP: NEGATIVE
KETONES UR QL: NEGATIVE
LEUKOCYTE EST, POC: NEGATIVE
LEUKOCYTE ESTERASE UR QL STRIP.AUTO: NEGATIVE
Lab: ABNORMAL
NITRITE UR QL STRIP: NEGATIVE
NITRITE UR-MCNC: NEGATIVE MG/ML
PH UR STRIP.AUTO: 6 [PH] (ref 5–8)
PH UR: 6 [PH] (ref 5–8)
PROT UR QL STRIP: NEGATIVE
PROT UR STRIP-MCNC: NEGATIVE MG/DL
RBC # UR STRIP: ABNORMAL /UL
RBC # UR STRIP: NORMAL /HPF
REF LAB TEST METHOD: NORMAL
SP GR UR STRIP: 1.01 (ref 1–1.03)
SP GR UR: 1.01 (ref 1–1.03)
SPECIMEN VOL 24H UR: 178 L
SQUAMOUS #/AREA URNS HPF: NORMAL /HPF
UROBILINOGEN UR QL STRIP: NORMAL
UROBILINOGEN UR QL: ABNORMAL
WBC # UR STRIP: NORMAL /HPF

## 2022-12-02 PROCEDURE — 99214 OFFICE O/P EST MOD 30 MIN: CPT | Performed by: UROLOGY

## 2022-12-02 PROCEDURE — 81003 URINALYSIS AUTO W/O SCOPE: CPT | Performed by: UROLOGY

## 2022-12-02 PROCEDURE — 51798 US URINE CAPACITY MEASURE: CPT | Performed by: UROLOGY

## 2022-12-02 PROCEDURE — 81001 URINALYSIS AUTO W/SCOPE: CPT | Performed by: UROLOGY

## 2022-12-06 ENCOUNTER — TRANSCRIBE ORDERS (OUTPATIENT)
Dept: ADMINISTRATIVE | Facility: HOSPITAL | Age: 72
End: 2022-12-06

## 2022-12-06 DIAGNOSIS — R51.9 FREQUENT HEADACHES: Primary | ICD-10-CM

## 2022-12-19 ENCOUNTER — HOSPITAL ENCOUNTER (OUTPATIENT)
Dept: MRI IMAGING | Facility: HOSPITAL | Age: 72
Discharge: HOME | End: 2022-12-19
Admitting: NURSE PRACTITIONER

## 2022-12-19 DIAGNOSIS — R51.9 FREQUENT HEADACHES: ICD-10-CM

## 2022-12-19 PROCEDURE — 70551 MRI BRAIN STEM W/O DYE: CPT

## 2023-01-04 RX ORDER — ALBUTEROL SULFATE 90 UG/1
2 AEROSOL, METERED RESPIRATORY (INHALATION) EVERY 4 HOURS PRN
COMMUNITY

## 2023-01-04 RX ORDER — FLUTICASONE PROPIONATE 50 MCG
2 SPRAY, SUSPENSION (ML) NASAL AS NEEDED
COMMUNITY

## 2023-01-04 NOTE — PRE-PROCEDURE INSTRUCTIONS
PT INSTRUCTED ON CLEAR LIQ DIET AND PO SPLIT PREP LAST BM SHOULD BE CLEAR  PT CAN TAKE  Amiodarone, apresoline, cardizem  WITH A SIP OF WATER IN THE AM OF THE PROCEDURE ARRIVAL TIME IS 0730 am ON 1/11/2023

## 2023-01-11 ENCOUNTER — HOSPITAL ENCOUNTER (OUTPATIENT)
Facility: HOSPITAL | Age: 73
Setting detail: HOSPITAL OUTPATIENT SURGERY
Discharge: HOME | End: 2023-01-11
Attending: SURGERY | Admitting: SURGERY
Payer: MEDICARE

## 2023-01-11 ENCOUNTER — ANESTHESIA EVENT (OUTPATIENT)
Dept: GASTROENTEROLOGY | Facility: HOSPITAL | Age: 73
End: 2023-01-11
Payer: MEDICARE

## 2023-01-11 ENCOUNTER — ANESTHESIA (OUTPATIENT)
Dept: GASTROENTEROLOGY | Facility: HOSPITAL | Age: 73
End: 2023-01-11
Payer: MEDICARE

## 2023-01-11 VITALS
DIASTOLIC BLOOD PRESSURE: 49 MMHG | OXYGEN SATURATION: 90 % | SYSTOLIC BLOOD PRESSURE: 94 MMHG | RESPIRATION RATE: 16 BRPM | WEIGHT: 169.75 LBS | HEIGHT: 65 IN | BODY MASS INDEX: 28.28 KG/M2 | HEART RATE: 65 BPM | TEMPERATURE: 98.7 F

## 2023-01-11 DIAGNOSIS — Z86.010 PERSONAL HISTORY OF COLONIC POLYPS: ICD-10-CM

## 2023-01-11 PROBLEM — Z86.0100 PERSONAL HISTORY OF COLONIC POLYPS: Status: RESOLVED | Noted: 2022-10-17 | Resolved: 2023-01-11

## 2023-01-11 PROCEDURE — 25010000002 PROPOFOL 10 MG/ML EMULSION: Performed by: NURSE ANESTHETIST, CERTIFIED REGISTERED

## 2023-01-11 PROCEDURE — 88305 TISSUE EXAM BY PATHOLOGIST: CPT | Performed by: SURGERY

## 2023-01-11 DEVICE — DEV CLIP HEMO RESOLUTION360/ULTR 235CM 17MM STRL: Type: IMPLANTABLE DEVICE | Site: RECTUM | Status: FUNCTIONAL

## 2023-01-11 DEVICE — DEV CLIP ENDO RESOLUTION360 CONTRL ROT 235CM: Type: IMPLANTABLE DEVICE | Site: RECTUM | Status: FUNCTIONAL

## 2023-01-11 RX ORDER — SODIUM CHLORIDE, SODIUM LACTATE, POTASSIUM CHLORIDE, CALCIUM CHLORIDE 600; 310; 30; 20 MG/100ML; MG/100ML; MG/100ML; MG/100ML
1000 INJECTION, SOLUTION INTRAVENOUS CONTINUOUS
Status: DISCONTINUED | OUTPATIENT
Start: 2023-01-11 | End: 2023-01-11 | Stop reason: HOSPADM

## 2023-01-11 RX ORDER — LIDOCAINE HYDROCHLORIDE 20 MG/ML
INJECTION, SOLUTION EPIDURAL; INFILTRATION; INTRACAUDAL; PERINEURAL AS NEEDED
Status: DISCONTINUED | OUTPATIENT
Start: 2023-01-11 | End: 2023-01-11 | Stop reason: SURG

## 2023-01-11 RX ADMIN — SODIUM CHLORIDE, POTASSIUM CHLORIDE, SODIUM LACTATE AND CALCIUM CHLORIDE: 600; 310; 30; 20 INJECTION, SOLUTION INTRAVENOUS at 08:34

## 2023-01-11 RX ADMIN — LIDOCAINE HYDROCHLORIDE 40 MG: 20 INJECTION, SOLUTION EPIDURAL; INFILTRATION; INTRACAUDAL; PERINEURAL at 08:45

## 2023-01-11 RX ADMIN — PROPOFOL 250 MCG/KG/MIN: 10 INJECTION, EMULSION INTRAVENOUS at 08:35

## 2023-01-11 NOTE — H&P
Chief Complaint:  Colonoscopy (Consult)    Primary Care Provider: Philomena Copeland APRN    Referring Provider:  Self referral    History of Present Illness  Jake Alvarado is a 72 y.o. male who was on a callback list to have a colonoscopy.  The patient had a colonoscopy by me on 5/26/21 for surveillance after the patient had a colonoscopy in 2019 and 5 polyps were removed.  During the colonoscopy in May 2021, I removed tubular adenoma that was about 20 to 25 mm size and located 20 cm from the anus.  The colon was tattooed just distal to the polyp and one Endo Clip was placed.  Patient was instructed to get a colonoscopy one year later and that is the reason for today's colonoscopy.  Patient takes Xarelto for atrial fibrillation.  He has stopped taking it before to have procedures performed.    Allergies: Patient has no known allergies.    Outpatient Medications Marked as Taking for the 10/17/22 encounter (Office Visit) with Chuck Macario MD   Medication Sig Dispense Refill   • acyclovir (ZOVIRAX) 400 MG tablet      • amiodarone (PACERONE) 200 MG tablet      • Aspirin Low Dose 81 MG EC tablet      • atorvastatin (LIPITOR) 40 MG tablet      • benazepril (LOTENSIN) 40 MG tablet      • dicyclomine (BENTYL) 10 MG capsule Take 1 capsule by mouth 3 (Three) Times a Day.     • dilTIAZem CD (CARDIZEM CD) 180 MG 24 hr capsule      • fexofenadine (ALLEGRA) 60 MG tablet Allegra Allergy 60 mg oral tablet take 1 tablet (60 mg) by oral route 2 times per day   Active     • finasteride (PROSCAR) 5 MG tablet Take 1 tablet by mouth Daily. 90 tablet 4   • fluticasone (FLONASE) 50 MCG/ACT nasal spray 2 sprays into the nostril(s) as directed by provider Daily.     • Fluticasone-Umeclidin-Vilant (Trelegy Ellipta) 100-62.5-25 MCG/INH inhaler Inhale 1 puff Daily.     • hydrALAZINE (APRESOLINE) 50 MG tablet Take 1.5 tablets by mouth 3 (Three) Times a Day.     • ipratropium-albuterol (COMBIVENT RESPIMAT)  MCG/ACT inhaler  "Inhale 1 puff 4 (Four) Times a Day As Needed for Wheezing.     • omeprazole (priLOSEC) 20 MG capsule Take 1 capsule by mouth 2 (Two) Times a Day.     • Xarelto 20 MG tablet          Past Medical History:   • Arthritis   • Asthma   • Bladder disorder   • Chronic allergic rhinitis   • Condition not found    Ulcer   • COPD (chronic obstructive pulmonary disease) ([facility])   • GERD (gastroesophageal reflux disease)   • Heart disease   • High blood pressure   • High cholesterol   • Lung disease   • Stroke (HCC)        Past Surgical History:   • BACK SURGERY   • COLON SURGERY       Family History:   Family History   Problem Relation Age of Onset   • Diabetes Mother    • No Known Problems Father    • Diabetes Sister    • Diabetes Brother    • Prostate cancer Maternal Uncle         Social History:  Social History     Tobacco Use   • Smoking status: Every Day     Packs/day: 1.00     Types: Cigarettes     Start date: 1965   • Smokeless tobacco: Never   • Tobacco comments:     Pt is trying to stop; currently down to 2 or 3 cig./day   Substance Use Topics   • Alcohol use: Yes     Comment: Current every day  drinks daily; liqour       Objective     Vital Signs:  Resp 16   Ht 165.1 cm (65\")   Wt 80.4 kg (177 lb 3.2 oz)   BMI 29.49 kg/m²   • Respiratory:  breathing not labored, respiratory effort appears normal  • Cardiovascular:  heart regular rate  • Musculoskeletal: moving all extremities symmetrically and purposefully  Neurologic:  no obvious motor or sensory deficits, alert & oriented      Assessment:  Personal history of colonic polyps    Plan:  Colonoscopy    Discussion: Indications, options, risks, benefits, and expected outcomes of planned surgery were discussed with the patient and he agrees to proceed.    Chuck Macario MD  1/11/23    Electronically signed by Chuck Macario MD, 01/11/23, 7:14 AM EST.  "

## 2023-01-11 NOTE — ANESTHESIA PREPROCEDURE EVALUATION
Anesthesia Evaluation     Patient summary reviewed and Nursing notes reviewed   no history of anesthetic complications:  NPO Solid Status: > 8 hours  NPO Liquid Status: > 2 hours           Airway   Mallampati: II  TM distance: >3 FB  Neck ROM: full  No difficulty expected  Dental    (+) edentulous    Pulmonary - normal exam    breath sounds clear to auscultation  (+) COPD, asthma,  Cardiovascular   Exercise tolerance: good (4-7 METS)    Rhythm: irregular  Rate: normal    (+) hypertension, dysrhythmias Atrial Fib, hyperlipidemia,       Neuro/Psych  (+) CVA,    GI/Hepatic/Renal/Endo    (+)  GERD,      Musculoskeletal     Abdominal    Substance History - negative use     OB/GYN negative ob/gyn ROS         Other   arthritis,      ROS/Med Hx Other: PAT Nursing Notes unavailable.                   Anesthesia Plan    ASA 3     general     (Total IV Anesthesia    Patient understands anesthesia not responsible for dental damage.  )  intravenous induction     Anesthetic plan, risks, benefits, and alternatives have been provided, discussed and informed consent has been obtained with: patient.    Plan discussed with CRNA.        CODE STATUS:

## 2023-01-11 NOTE — ANESTHESIA POSTPROCEDURE EVALUATION
Patient: Jake Alvarado    Procedure Summary     Date: 01/11/23 Room / Location: [facility] ENDOSCOPY 1 / [facility] ENDOSCOPY    Anesthesia Start: 0834 Anesthesia Stop: 0913    Procedure: COLONOSCOPY WITH POLYPECTOMIES HOT AND COLD SNARE, CLIP APPLICATION X3 Diagnosis:       Personal history of colonic polyps      (Personal history of colonic polyps [Z86.010])    Surgeons: Chuck Macario MD Provider: Kurt Claire MD    Anesthesia Type: general ASA Status: 3          Anesthesia Type: general    Vitals  Vitals Value Taken Time   BP 94/49 01/11/23 0925   Temp 37.1 °C (98.7 °F) 01/11/23 0925   Pulse 68 01/11/23 0927   Resp 16 01/11/23 0925   SpO2 92 % 01/11/23 0927   Vitals shown include unvalidated device data.        Post Anesthesia Care and Evaluation    Patient location during evaluation: bedside  Patient participation: complete - patient participated  Level of consciousness: awake  Pain management: adequate    Airway patency: patent  Anesthetic complications: No anesthetic complications  PONV Status: none  Cardiovascular status: acceptable and stable  Respiratory status: acceptable  Hydration status: acceptable    Comments: An Anesthesiologist personally participated in the most demanding procedures (including induction and emergence if applicable) in the anesthesia plan, monitored the course of anesthesia administration at frequent intervals and remained physically present and available for immediate diagnosis and treatment of emergencies.

## 2023-01-11 NOTE — DISCHARGE INSTRUCTIONS
You may resume normal activity on 1/12/23 at 0910.  Stay away from greasy, gas producing, and spicy foods today.  Start off with bland foods.  Be near a bathroom when you eat, the bowel prep might still be working it's way through your body.  It is normal to find a little blood on your toilet paper.  IF YOU ARE PASSING BLOOD CLOTS OR FILLING THE TOILET, CALL 911 AND/OR GO TO YOUR NEAREST ER.    NO DRIVING, SIGNING LEGAL DOCUMENTS, OR ALCOHOL CONSUMPTION FOR 24 HOURS.

## 2023-01-13 LAB
CYTO UR: NORMAL
LAB AP CASE REPORT: NORMAL
LAB AP CLINICAL INFORMATION: NORMAL
PATH REPORT.FINAL DX SPEC: NORMAL
PATH REPORT.GROSS SPEC: NORMAL

## 2023-03-08 ENCOUNTER — TRANSCRIBE ORDERS (OUTPATIENT)
Dept: ADMINISTRATIVE | Facility: HOSPITAL | Age: 73
End: 2023-03-08
Payer: MEDICARE

## 2023-03-08 DIAGNOSIS — R93.89 ABNORMAL MRI: Primary | ICD-10-CM

## 2023-03-08 DIAGNOSIS — Z13.6 ENCOUNTER FOR ABDOMINAL AORTIC ANEURYSM (AAA) SCREENING: ICD-10-CM

## 2023-03-22 ENCOUNTER — HOSPITAL ENCOUNTER (OUTPATIENT)
Dept: MRI IMAGING | Facility: HOSPITAL | Age: 73
Discharge: HOME | End: 2023-03-22
Payer: MEDICARE

## 2023-03-22 ENCOUNTER — HOSPITAL ENCOUNTER (OUTPATIENT)
Dept: ULTRASOUND IMAGING | Facility: HOSPITAL | Age: 73
Discharge: HOME | End: 2023-03-22
Payer: MEDICARE

## 2023-03-22 DIAGNOSIS — R93.89 ABNORMAL MRI: ICD-10-CM

## 2023-03-22 DIAGNOSIS — Z13.6 ENCOUNTER FOR ABDOMINAL AORTIC ANEURYSM (AAA) SCREENING: ICD-10-CM

## 2023-03-22 PROCEDURE — 70551 MRI BRAIN STEM W/O DYE: CPT

## 2023-03-22 PROCEDURE — 76706 US ABDL AORTA SCREEN AAA: CPT

## 2023-06-09 ENCOUNTER — OFFICE VISIT (OUTPATIENT)
Dept: SURGERY | Facility: CLINIC | Age: 73
End: 2023-06-09
Payer: MEDICARE

## 2023-06-09 ENCOUNTER — PREP FOR SURGERY (OUTPATIENT)
Dept: OTHER | Facility: HOSPITAL | Age: 73
End: 2023-06-09
Payer: MEDICARE

## 2023-06-09 VITALS — HEIGHT: 65 IN | BODY MASS INDEX: 28.32 KG/M2 | WEIGHT: 170 LBS | RESPIRATION RATE: 16 BRPM

## 2023-06-09 DIAGNOSIS — K22.70 BARRETT'S ESOPHAGUS WITHOUT DYSPLASIA: ICD-10-CM

## 2023-06-09 DIAGNOSIS — Z86.010 PERSONAL HISTORY OF COLONIC POLYPS: Primary | ICD-10-CM

## 2023-06-09 DIAGNOSIS — Z86.010 PERSONAL HISTORY OF COLONIC POLYPS: ICD-10-CM

## 2023-06-09 DIAGNOSIS — K22.70 BARRETT'S ESOPHAGUS WITHOUT DYSPLASIA: Primary | ICD-10-CM

## 2023-06-09 PROBLEM — Z86.0100 PERSONAL HISTORY OF COLONIC POLYPS: Status: ACTIVE | Noted: 2023-06-09

## 2023-06-09 RX ORDER — HYDROXYZINE HYDROCHLORIDE 25 MG/1
TABLET, FILM COATED ORAL
COMMUNITY
Start: 2023-03-09

## 2023-06-09 NOTE — PROGRESS NOTES
Chief Complaint:  Follow-up    Primary Care Provider: Aris Menon MD    Referring Provider:  Self referral    History of Present Illness  Jake Alvarado is a 72 y.o. male here to schedule and EGD and a colonoscopy.    Patient had a colonoscopy on 5/26/21 for surveillance after the patient had a colonoscopy in 2019 and 5  polyps were removed. During the colonoscopy in May 2021, a tubular adenoma about 20 to 25  mm size and located 20 cm from the anus was removed. The colon was tattooed just distal to the polyp.    Colonoscopy was then done in 1/11/23 and a 20 mm polyp was found at 20 cm proximal to the anus. This was located just proximal to the tattoo from polyp  removal during last colonoscopy.   Pathology showed a juvenile retention polyp.      Also, the patient has Parrish's esophagus and this was diagnosed in May 2021 when he had an EGD.      The patient takes Xarelto for atrial fibrillation..  He has stopped taking it before to have procedures performed.      Allergies: Patient has no known allergies.    Outpatient Medications Marked as Taking for the 6/9/23 encounter (Office Visit) with Chuck Macario MD   Medication Sig Dispense Refill   • acyclovir (ZOVIRAX) 400 MG tablet Take 1 tablet by mouth Every Night.     • albuterol sulfate  (90 Base) MCG/ACT inhaler Inhale 2 puffs Every 4 (Four) Hours As Needed for Wheezing.     • amiodarone (PACERONE) 200 MG tablet Take 100 mg by mouth Daily.     • Aspirin Low Dose 81 MG EC tablet 1 tablet Daily. Sunday 1/8/2023     • atorvastatin (LIPITOR) 40 MG tablet Take 1 tablet by mouth Every Night.     • benazepril (LOTENSIN) 40 MG tablet Take 1 tablet by mouth Daily.     • dicyclomine (BENTYL) 10 MG capsule Take 1 capsule by mouth 2 (Two) Times a Day.     • dilTIAZem CD (CARDIZEM CD) 180 MG 24 hr capsule 1 capsule Daily.     • fexofenadine (ALLEGRA) 60 MG tablet Take 1 tablet by mouth Daily.     • fluticasone (FLONASE) 50 MCG/ACT nasal spray 2 sprays into  "the nostril(s) as directed by provider As Needed for Rhinitis.     • Fluticasone-Umeclidin-Vilant (Trelegy Ellipta) 100-62.5-25 MCG/INH inhaler Inhale 1 puff Daily.     • hydrALAZINE (APRESOLINE) 50 MG tablet Take 1.5 tablets by mouth. 75 mg x2 a day and 50 mg at bedtime     • omeprazole (priLOSEC) 20 MG capsule Take 1 capsule by mouth 2 (Two) Times a Day.     • Xarelto 20 MG tablet Take 1 tablet by mouth Daily. Last dose Sunday 1/8/2023         Past Medical History:   • Arthritis   • Asthma   • Atrial fibrillation   • Bladder disorder   • Chronic allergic rhinitis   • Condition not found    Ulcer   • COPD (chronic obstructive pulmonary disease)   • GERD (gastroesophageal reflux disease)   • Heart disease   • High blood pressure   • High cholesterol   • Lung disease   • Stroke        Past Surgical History:   • BACK SURGERY   • CAROTID ENDARTERECTOMY    2006, 2017 and left side 2018   • COLON SURGERY    x3   • COLONOSCOPY   • COLONOSCOPY    Procedure: COLONOSCOPY WITH POLYPECTOMIES HOT AND COLD SNARE, CLIP APPLICATION X3;  Surgeon: Chuck Macario MD;  Location: [facility] ENDOSCOPY;  Service: General;  Laterality: N/A;  COLON POLYPS, DIVERTICULOSIS   • EYE SURGERY    laser surgery   • TONSILLECTOMY       Family History:   Family History   Problem Relation Age of Onset   • Diabetes Mother    • No Known Problems Father    • Diabetes Sister    • Diabetes Brother    • Prostate cancer Maternal Uncle    • Malig Hyperthermia Neg Hx         Social History:  Social History     Tobacco Use   • Smoking status: Every Day     Packs/day: 1.00     Types: Cigarettes     Start date: 1965   • Smokeless tobacco: Never   • Tobacco comments:     Pt is trying to stop; currently down to 2 or 3 cig./day   Substance Use Topics   • Alcohol use: Yes     Comment: Current every day  drinks daily; liqour       Objective     Vital Signs:  Resp 16   Ht 165.1 cm (65\")   Wt 77.1 kg (170 lb)   BMI 28.29 kg/m²   • Constitutional: healthy appearing, " alert, no acute distress, reliable historian  • HENT:  NCAT, no visible deformities or lesions  • Eyes:  sclerae clear, conjunctivae clear, EOMI  • Neck:  normal appearance, no masses, trachea midline  • Respiratory:  breathing not labored, respiratory effort appears normal  • Cardiovascular:  heart regular rate  • Abdomen:  soft, nontender, nondistended    • Skin and subcutaneous tissue:  no visible concerning rashes or lesions, no jaundice  • Musculoskeletal: moving all extremities symmetrically and purposefully  • Neurologic:  no obvious motor or sensory deficits, normal gait, able to stand without difficulty, cerebellar function without any obvious abnormalities, alert & oriented x 3, speech clear  • Psychiatric:  judgment and insight intact, mood normal, affect appropriate, cooperative      Assessment:  Diagnoses and all orders for this visit:    1. Parrish's esophagus without dysplasia (Primary)    2. Personal history of colonic polyps        Plan:  Esophagogastroduodenoscopy and colonoscopy    Discussion: Indications, options, risks, benefits, and expected outcomes of planned surgery were discussed with the patient and he agrees to proceed.    Chuck Macario MD  06/09/2023    Electronically signed by Chuck Macario MD, 06/09/23, 3:33 PM EDT.

## 2023-12-04 NOTE — PROGRESS NOTES
Chief Complaint    Urologic complaint    Subjective          Jake Alvarado presents to [facility] UROLOGY  History of Present Illness           74 yo WM      BPH w  LUTS   h/o microhematuria        Urinary symptoms little worse in the last year.  He does have some urgency with leakage at times.  No pads.      Tried finasteride for 1 year before, did not make a difference when he stopped this.  Nocturia 4-5 times nightly,   daytime frequency with urgency     weak stream sometimes.       2-4 cups of caffeinated coffee daily.  Some alcohol in the evening    COPD  -  A fib. No CAD,  smokes.  Xarelto and aspirin 81.  Followed by pulmonary    Not worried about ED        PVR     12/23  107  11/22  170  6/21    144  2/ 20   70      Previous    10/1/2021 CT urogram-prostatomegaly, diverticulosis, recommend colonoscopy, no  findings, severe arthrosclerosis in the abdominal aorta and iliac vascular distributions.  AAA measuring 3 cm in transverse dimension.  Delayed phase okay    8/21 cystoscopy-2.5 cm prostate, moderate trabeculations with some very shallow diverticulum posteriorly on the dome.  Negative otherwise.      doxazosin -   Could not really tell any difference when he came off his medication. Had side effects.  Patient does drink a lot of fluid daily, he also drinks some alcohol in the evening.        Flomax - did not help  tolteradine - did not help    No history of kidney stone.    uncle with prostate CA,   Has never had any urologic surgery.      12/19 creatinine 1.0, GFR greater than 60    PSA    3/21  1.1  11/18 1.3  5/17 1.0          Past History:  Medical History: has a past medical history of Arthritis, Asthma, Atrial fibrillation, Bladder disorder, Chronic allergic rhinitis, Condition not found, COPD (chronic obstructive pulmonary disease), GERD (gastroesophageal reflux disease), Heart disease, High blood pressure, High cholesterol, Lung disease, and Stroke.   Surgical History: has a  past surgical history that includes Back surgery; Colon surgery (N/A); Carotid Endarterectomy (Right); Tonsillectomy; Eye surgery (Right); Colonoscopy; and Colonoscopy (N/A, 1/11/2023).   Family History: family history includes Diabetes in his brother, mother, and sister; No Known Problems in his father; Prostate cancer in his maternal uncle.   Social History: reports that he has been smoking cigarettes. He started smoking about 58 years ago. He has been smoking an average of 1 pack per day. He has never used smokeless tobacco. He reports current alcohol use. Drug use questions deferred to the physician.  Allergies: Patient has no known allergies.       Current Outpatient Medications:     acyclovir (ZOVIRAX) 400 MG tablet, Take 1 tablet by mouth Every Night., Disp: , Rfl:     albuterol sulfate  (90 Base) MCG/ACT inhaler, Inhale 2 puffs Every 4 (Four) Hours As Needed for Wheezing., Disp: , Rfl:     amiodarone (PACERONE) 200 MG tablet, Take 100 mg by mouth Daily., Disp: , Rfl:     Aspirin Low Dose 81 MG EC tablet, 1 tablet Daily. Sunday 1/8/2023, Disp: , Rfl:     atorvastatin (LIPITOR) 40 MG tablet, Take 1 tablet by mouth Every Night., Disp: , Rfl:     benazepril (LOTENSIN) 40 MG tablet, Take 1 tablet by mouth Daily., Disp: , Rfl:     dicyclomine (BENTYL) 10 MG capsule, Take 1 capsule by mouth 2 (Two) Times a Day., Disp: , Rfl:     dilTIAZem CD (CARDIZEM CD) 180 MG 24 hr capsule, 1 capsule Daily., Disp: , Rfl:     fexofenadine (ALLEGRA) 60 MG tablet, Take 1 tablet by mouth Daily., Disp: , Rfl:     fluticasone (FLONASE) 50 MCG/ACT nasal spray, 2 sprays into the nostril(s) as directed by provider As Needed for Rhinitis., Disp: , Rfl:     Fluticasone-Umeclidin-Vilant (Trelegy Ellipta) 100-62.5-25 MCG/INH inhaler, Inhale 1 puff Daily., Disp: , Rfl:     hydrALAZINE (APRESOLINE) 50 MG tablet, Take 1.5 tablets by mouth. 75 mg x2 a day and 50 mg at bedtime, Disp: , Rfl:     hydrOXYzine (ATARAX) 25 MG tablet, , Disp: ,  "Rfl:     omeprazole (priLOSEC) 20 MG capsule, Take 1 capsule by mouth 2 (Two) Times a Day., Disp: , Rfl:     Xarelto 20 MG tablet, Take 1 tablet by mouth Daily. Last dose Sunday 1/8/2023, Disp: , Rfl:          Results for orders placed or performed during the hospital encounter of 01/11/23   Tissue Pathology Exam    Specimen: A: Large Intestine, Rectum; Polyp    B: Large Intestine, Transverse Colon; Polyp    C: Large Intestine, Rectum; Polyp   Result Value Ref Range    Case Report       Surgical Pathology Report                         Case: [id number]                                  Authorizing Provider:  Chuck Macario MD        Collected:           01/11/2023 08:47 AM          Ordering Location:     [facility] Received:            01/12/2023 08:44 AM                                 SUITES                                                                       Pathologist:           Ladi Morel MD                                                     Specimens:   1) - Large Intestine, Rectum, 20CM POLYP HOT SNARE                                                  2) - Large Intestine, Transverse Colon, TRANSVERSE COLON POLYP                                      3) - Large Intestine, Rectum, RECTAL POLYP COLD SNARE                                      Clinical Information       Personal history of colonic polyps      Final Diagnosis       1.  Colon polyp, 20 cm, biopsy:    - Juvenile (retention) polyp      2. Transverse colon polyp, biopsy:    - Tubular adenoma      3. Rectum polyp, biopsy:    - Hyperplastic polyp          Gross Description       1. Large Intestine, Rectum.  Received in formalin and labeled \" 20 cm polyp hot snare\" is a 2.0 cm fragment of tan soft tissue.  The margin is inked blue, and trisecting reveals a tan, soft cut surface.  The specimen is entirely submitted in 2 cassettes.    2. Large Intestine, Transverse Colon.  Received in formalin and labeled \" transverse colon " "polyp\" is a 0.3 cm fragment of tan soft tissue. The specimen is entirely submitted in one cassette.    3. Large Intestine, Rectum.  Received in formalin and labeled \" rectal polyp cold snare\" is a 0.5 cm fragment of tan soft tissue. The specimen is entirely submitted in one cassette.   YAMEL      Microscopic Description          Bladder Scan interpretation 12/02/2022    Estimation of residual urine via BVI 3000 Verathon Bladder Scan  MA/nurse performing: Adenike Hassan MA  Residual Urine: 178  ml  Indication: Benign prostatic hyperplasia with lower urinary tract symptoms, symptom details unspecified    Microhematuria   Position: Supine  Examination: Incremental scanning of the suprapubic area using 2.0 MHz transducer using copious amounts of acoustic gel.   Findings: An anechoic area was demonstrated which represented the bladder, with measurement of residual urine as noted. I inspected this myself. In that the residual urine was stable or insignificant, refer to plan for treatment and plan necessary at this time.         Objective     Vital Signs:   There were no vitals taken for this visit.        Bladder Scan interpretation 12/08/2023    Estimation of residual urine via BVI 3000 Verathon Bladder Scan  MA/nurse performing: Adenike HOLBROOK  Residual Urine: 107 ml  Indication: Benign prostatic hyperplasia with lower urinary tract symptoms, symptom details unspecified    Microhematuria   Position: Supine  Examination: Incremental scanning of the suprapubic area using 2.0 MHz transducer using copious amounts of acoustic gel.   Findings: An anechoic area was demonstrated which represented the bladder, with measurement of residual urine as noted. I inspected this myself. In that the residual urine was stable or insignificant, refer to plan for treatment and plan necessary at this time.            Assessment and Plan    Diagnoses and all orders for this visit:    1. Benign prostatic hyperplasia with lower urinary tract symptoms, " symptom details unspecified (Primary)    2. Microhematuria        BPH/ with urgency/frequency        nocturia bothering patient the most    He is going to decrease fluids a few hours before bedtime to see if this helps      We did discuss Rezum and TURP today.  At this time he is going to hold off on procedures      Follow-up in 1 year    PVR at   f/u

## 2023-12-08 ENCOUNTER — OFFICE VISIT (OUTPATIENT)
Dept: UROLOGY | Facility: CLINIC | Age: 73
End: 2023-12-08
Payer: MEDICARE

## 2023-12-08 VITALS — BODY MASS INDEX: 28.32 KG/M2 | HEIGHT: 65 IN | WEIGHT: 170 LBS | RESPIRATION RATE: 16 BRPM

## 2023-12-08 DIAGNOSIS — N40.1 BENIGN PROSTATIC HYPERPLASIA WITH LOWER URINARY TRACT SYMPTOMS, SYMPTOM DETAILS UNSPECIFIED: Primary | ICD-10-CM

## 2023-12-08 DIAGNOSIS — R31.29 MICROHEMATURIA: ICD-10-CM

## 2023-12-08 LAB
BILIRUB BLD-MCNC: NEGATIVE MG/DL
CLARITY, POC: CLEAR
COLOR UR: YELLOW
EXPIRATION DATE: ABNORMAL
GLUCOSE UR STRIP-MCNC: NEGATIVE MG/DL
KETONES UR QL: NEGATIVE
LEUKOCYTE EST, POC: NEGATIVE
Lab: ABNORMAL
NITRITE UR-MCNC: NEGATIVE MG/ML
PH UR: 6 [PH] (ref 5–8)
PROT UR STRIP-MCNC: NEGATIVE MG/DL
RBC # UR STRIP: ABNORMAL /UL
SP GR UR: 1.02 (ref 1–1.03)
SPECIMEN VOL 24H UR: 107 L
UROBILINOGEN UR QL: ABNORMAL

## 2024-01-04 NOTE — PRE-PROCEDURE INSTRUCTIONS
PAT call attempted.  No answer.  Left detailed message with:  date, arrival time of 0600,   location, need for , children under 12 must remain in waiting room, diet/NPO, meds, bring list of meds to hospital, and call back number for questions.  Hold Xarelto for 2 days prior to procedure.

## 2024-01-08 ENCOUNTER — TELEPHONE (OUTPATIENT)
Dept: SURGERY | Facility: CLINIC | Age: 74
End: 2024-01-08
Payer: MEDICARE

## 2024-01-08 NOTE — TELEPHONE ENCOUNTER
PT IS SCHEDULED FOR EGD/COLON ON 1/10. HE WANTS TO MAKE DR BIGGS AWARE THAT HE HAS BEEN HAVING ONGOING GI ISSUES FOR THE PAST 2-3 YEARS. HE HAS EPISODES OF BAD ABDOMINAL CRAMPS, DIARRHEA,VOMITING AND BOUTS OF CONSTIPATION. HE HAS BEEN REFERRED TO GI AND THEY ARE NOT GOING TO SEE HIM UNTIL MAY. I INFORMED THE PATIENT THAT HE COULD DISCUSS THIS THE MORNING OF THE PROCEDURE BUT HE ASKED THAT I LEAVE A MESSAGE.

## 2024-01-09 ENCOUNTER — ANESTHESIA EVENT (OUTPATIENT)
Dept: GASTROENTEROLOGY | Facility: HOSPITAL | Age: 74
End: 2024-01-09
Payer: MEDICARE

## 2024-01-09 NOTE — H&P
Chief Complaint:  No chief complaint on file.    Primary Care Provider: Aris Menon MD    Referring Provider:  Self referral    History of Present Illness  Jake Alvarado is a 73 y.o. male here to have an EGD and a colonoscopy.  Following is a copy of the HPI from my last office visit.    Patient had a colonoscopy on 5/26/21 for surveillance after the patient had a colonoscopy in 2019 and 5 polyps were removed. During the colonoscopy in May 2021, a tubular adenoma about 20 to 25 mm size and located 20 cm from the anus was removed. The colon was tattooed just distal to the polyp.    Colonoscopy was then done in 1/11/23 and a 20 mm polyp was found at 20 cm proximal to the anus. This was located just proximal to the tattoo from polyp removal during last colonoscopy.   Pathology showed a juvenile retention polyp.      Also, the patient has Parrish's esophagus and this was diagnosed in May 2021 when he had an EGD.      The patient takes Xarelto for atrial fibrillation.      Allergies: Patient has no known allergies.    Home medicines:  acyclovir (ZOVIRAX) 400 MG tablet           amiodarone (PACERONE) 200 MG tablet          Aspirin Low Dose 81 MG EC tablet          atorvastatin (LIPITOR) 40 MG tablet          benazepril (LOTENSIN) 40 MG tablet          dicyclomine (BENTYL) 10 MG capsule Take 1 capsule by mouth 3 (Three) Times a Day.        dilTIAZem CD (CARDIZEM CD) 180 MG 24 hr capsule          fexofenadine (ALLEGRA) 60 MG tablet Allegra Allergy 60 mg oral tablet take 1 tablet (60 mg) by oral route 2 times per day   Active        finasteride (PROSCAR) 5 MG tablet Take 1 tablet by mouth Daily. 90 tablet 4    fluticasone (FLONASE) 50 MCG/ACT nasal spray 2 sprays into the nostril(s) as directed by provider Daily.        Fluticasone-Umeclidin-Vilant (Trelegy Ellipta) 100-62.5-25 MCG/INH inhaler Inhale 1 puff Daily.        hydrALAZINE (APRESOLINE) 50 MG tablet Take 1.5 tablets by mouth 3 (Three) Times a Day.         ipratropium-albuterol (COMBIVENT RESPIMAT)  MCG/ACT inhaler Inhale 1 puff 4 (Four) Times a Day As Needed for Wheezing.        omeprazole (priLOSEC) 20 MG capsule Take 1 capsule by mouth 2 (Two) Times a Day.        Xarelto 20 MG tablet          Past Medical History:    Arthritis    Asthma    Atrial fibrillation    Bladder disorder    Chronic allergic rhinitis    Condition not found    Ulcer    COPD (chronic obstructive pulmonary disease)    GERD (gastroesophageal reflux disease)    Heart disease    High blood pressure    High cholesterol    Lung disease    Stroke        Past Surgical History:    BACK SURGERY    CAROTID ENDARTERECTOMY    2006, 2017 and left side 2018    COLON SURGERY    x3    COLONOSCOPY    COLONOSCOPY    Procedure: COLONOSCOPY WITH POLYPECTOMIES HOT AND COLD SNARE, CLIP APPLICATION X3;  Surgeon: Chuck Macario MD;  Location: [facility] ENDOSCOPY;  Service: General;  Laterality: N/A;  COLON POLYPS, DIVERTICULOSIS    EYE SURGERY    laser surgery    TONSILLECTOMY       Family History:   Family History   Problem Relation Age of Onset    Diabetes Mother     No Known Problems Father     Diabetes Sister     Diabetes Brother     Prostate cancer Maternal Uncle     Malig Hyperthermia Neg Hx         Social History:  Social History     Tobacco Use    Smoking status: Every Day     Packs/day: 1     Types: Cigarettes     Start date: 1965    Smokeless tobacco: Never    Tobacco comments:     Pt is trying to stop; currently down to 2 or 3 cig./day   Substance Use Topics    Alcohol use: Yes     Comment: Current every day  drinks daily; liqour       Physical Exam  Vitals - Available in the EMR.  Reviewed.  Respiratory:  breathing not labored, respiratory effort appears normal  Cardiovascular:  heart regular rate  Skin and subcutaneous tissue:  warm and dry  Musculoskeletal: moving all extremities symmetrically and purposefully  Neurologic:  no obvious motor or sensory deficits, speech clear  Psychiatric:  judgment  and insight intact, mood normal      Assessment   Personal history of colon polyps  Parrish's esophagus without dysplasia    Plan  Colonoscopy and Esophagogastroduodenoscopy    Risks and benefits discussed    Chuck Macario M.D.  01/09/24    Electronically signed by Chuck Macario MD, 01/09/24, 4:18 PM EST.

## 2024-01-09 NOTE — ANESTHESIA PREPROCEDURE EVALUATION
Anesthesia Evaluation     Patient summary reviewed and Nursing notes reviewed   NPO Solid Status: > 8 hours  NPO Liquid Status: > 2 hours           Airway   Mallampati: I  TM distance: >3 FB  Neck ROM: full  No difficulty expected  Dental    (+) edentulous    Pulmonary     breath sounds clear to auscultation  (+) a smoker Current, Smoked day of surgery, cigarettes, COPD (uses trelegy daily) moderate, asthma,  Cardiovascular   Exercise tolerance: poor (<4 METS)    ECG reviewed  PT is on anticoagulation therapy  Rhythm: regular  Rate: normal    (+) hypertension well controlled, dysrhythmias Atrial Fib, hyperlipidemia,  carotid artery disease (previous endarectomy x's 3 bilateral)      Neuro/Psych  (+) CVA  GI/Hepatic/Renal/Endo    (+) GERD well controlled    Musculoskeletal     Abdominal    Substance History      OB/GYN          Other   arthritis,     ROS/Med Hx Other: Last dose Xarelto 01/07    EKG 01/05/20 : HR 60, SR     US AAA screen 03/22/23:   Borderline aneurysmal dilation of the mid aorta.  Maximal dimension of the mid aorta is about 2.9 centimeters.  Similar findings likely seen on previous CT from 10/1/2021.  There may be moderate to severe atherosclerosis of the mid aorta.  Consider follow-up CT.  Recommend at least   continued follow-up.                  Anesthesia Plan    ASA 3     general   total IV anesthesia  intravenous induction     Anesthetic plan, risks, benefits, and alternatives have been provided, discussed and informed consent has been obtained with: patient.  Pre-procedure education provided  Plan discussed with CRNA.      CODE STATUS:

## 2024-01-10 ENCOUNTER — ANESTHESIA (OUTPATIENT)
Dept: GASTROENTEROLOGY | Facility: HOSPITAL | Age: 74
End: 2024-01-10
Payer: MEDICARE

## 2024-01-10 ENCOUNTER — HOSPITAL ENCOUNTER (OUTPATIENT)
Facility: HOSPITAL | Age: 74
Setting detail: HOSPITAL OUTPATIENT SURGERY
Discharge: HOME | End: 2024-01-10
Attending: SURGERY | Admitting: SURGERY
Payer: MEDICARE

## 2024-01-10 VITALS
BODY MASS INDEX: 27.66 KG/M2 | TEMPERATURE: 98.2 F | WEIGHT: 166.23 LBS | SYSTOLIC BLOOD PRESSURE: 112 MMHG | OXYGEN SATURATION: 96 % | HEART RATE: 68 BPM | DIASTOLIC BLOOD PRESSURE: 56 MMHG | RESPIRATION RATE: 16 BRPM

## 2024-01-10 DIAGNOSIS — K22.70 BARRETT'S ESOPHAGUS WITHOUT DYSPLASIA: ICD-10-CM

## 2024-01-10 DIAGNOSIS — Z86.010 PERSONAL HISTORY OF COLONIC POLYPS: ICD-10-CM

## 2024-01-10 PROBLEM — Z86.0100 PERSONAL HISTORY OF COLONIC POLYPS: Status: RESOLVED | Noted: 2023-06-09 | Resolved: 2024-01-10

## 2024-01-10 PROCEDURE — 25010000002 GLYCOPYRROLATE 0.2 MG/ML SOLUTION: Performed by: NURSE ANESTHETIST, CERTIFIED REGISTERED

## 2024-01-10 PROCEDURE — 25810000003 LACTATED RINGERS PER 1000 ML

## 2024-01-10 PROCEDURE — 88305 TISSUE EXAM BY PATHOLOGIST: CPT | Performed by: SURGERY

## 2024-01-10 PROCEDURE — 25010000002 PROPOFOL 10 MG/ML EMULSION: Performed by: NURSE ANESTHETIST, CERTIFIED REGISTERED

## 2024-01-10 RX ORDER — SODIUM CHLORIDE, SODIUM LACTATE, POTASSIUM CHLORIDE, CALCIUM CHLORIDE 600; 310; 30; 20 MG/100ML; MG/100ML; MG/100ML; MG/100ML
30 INJECTION, SOLUTION INTRAVENOUS CONTINUOUS
Status: DISCONTINUED | OUTPATIENT
Start: 2024-01-10 | End: 2024-01-10 | Stop reason: HOSPADM

## 2024-01-10 RX ORDER — PROPOFOL 10 MG/ML
VIAL (ML) INTRAVENOUS AS NEEDED
Status: DISCONTINUED | OUTPATIENT
Start: 2024-01-10 | End: 2024-01-10 | Stop reason: SURG

## 2024-01-10 RX ORDER — LIDOCAINE HYDROCHLORIDE 20 MG/ML
INJECTION, SOLUTION EPIDURAL; INFILTRATION; INTRACAUDAL; PERINEURAL AS NEEDED
Status: DISCONTINUED | OUTPATIENT
Start: 2024-01-10 | End: 2024-01-10 | Stop reason: SURG

## 2024-01-10 RX ORDER — PHENYLEPHRINE HCL IN 0.9% NACL 1 MG/10 ML
SYRINGE (ML) INTRAVENOUS AS NEEDED
Status: DISCONTINUED | OUTPATIENT
Start: 2024-01-10 | End: 2024-01-10 | Stop reason: SURG

## 2024-01-10 RX ORDER — GLYCOPYRROLATE 0.2 MG/ML
INJECTION INTRAMUSCULAR; INTRAVENOUS AS NEEDED
Status: DISCONTINUED | OUTPATIENT
Start: 2024-01-10 | End: 2024-01-10 | Stop reason: SURG

## 2024-01-10 RX ADMIN — PROPOFOL 50 MG: 10 INJECTION, EMULSION INTRAVENOUS at 07:24

## 2024-01-10 RX ADMIN — Medication 200 MCG: at 07:59

## 2024-01-10 RX ADMIN — LIDOCAINE HYDROCHLORIDE 50 MG: 20 INJECTION, SOLUTION EPIDURAL; INFILTRATION; INTRACAUDAL; PERINEURAL at 07:24

## 2024-01-10 RX ADMIN — SODIUM CHLORIDE, POTASSIUM CHLORIDE, SODIUM LACTATE AND CALCIUM CHLORIDE 30 ML/HR: 600; 310; 30; 20 INJECTION, SOLUTION INTRAVENOUS at 06:48

## 2024-01-10 RX ADMIN — Medication 100 MCG: at 07:43

## 2024-01-10 RX ADMIN — Medication 100 MCG: at 07:51

## 2024-01-10 RX ADMIN — GLYCOPYRROLATE 0.2 MG: 0.2 INJECTION INTRAMUSCULAR; INTRAVENOUS at 07:30

## 2024-01-10 RX ADMIN — PROPOFOL 175 MCG/KG/MIN: 10 INJECTION, EMULSION INTRAVENOUS at 07:24

## 2024-01-10 NOTE — ANESTHESIA POSTPROCEDURE EVALUATION
Patient: Jake Alvarado    Procedure Summary       Date: 01/10/24 Room / Location: [facility] ENDOSCOPY 1 / [facility] END[facility]    Anesthesia Start: 0721 Anesthesia Stop: 0800    Procedures:       ESOPHAGOGASTRODUODENOSCOPY with biopsy      COLONOSCOPY with cold snare polypectomy Diagnosis:       Personal history of colonic polyps      Parrish's esophagus without dysplasia      (Personal history of colonic polyps [Z86.010])      (Parrish's esophagus without dysplasia [K22.70])    Surgeons: Chuck Macario MD Provider: Christy Roman CRNA    Anesthesia Type: general ASA Status: 3            Anesthesia Type: general    Vitals  Vitals Value Taken Time   BP 70/46 01/10/24 0809   Temp 36.7 °C (98 °F) 01/10/24 0758   Pulse 70 01/10/24 0811   Resp 18 01/10/24 0803   SpO2 94 % 01/10/24 0811   Vitals shown include unfiled device data.        Post Anesthesia Care and Evaluation    Post-procedure mental status: acceptable.  Pain management: satisfactory to patient    Airway patency: patent  Anesthetic complications: No anesthetic complications    Cardiovascular status: acceptable  Respiratory status: acceptable    Comments: Per chart review

## 2024-03-13 ENCOUNTER — TRANSCRIBE ORDERS (OUTPATIENT)
Dept: ADMINISTRATIVE | Facility: HOSPITAL | Age: 74
End: 2024-03-13
Payer: MEDICARE

## 2024-03-13 DIAGNOSIS — Z13.6 SCREENING FOR AAA (ABDOMINAL AORTIC ANEURYSM): Primary | ICD-10-CM

## 2024-03-21 ENCOUNTER — HOSPITAL ENCOUNTER (OUTPATIENT)
Dept: ULTRASOUND IMAGING | Facility: HOSPITAL | Age: 74
Discharge: HOME | End: 2024-03-21
Admitting: NURSE PRACTITIONER
Payer: MEDICARE

## 2024-03-21 DIAGNOSIS — Z13.6 SCREENING FOR AAA (ABDOMINAL AORTIC ANEURYSM): ICD-10-CM

## 2024-03-21 PROCEDURE — 76706 US ABDL AORTA SCREEN AAA: CPT

## 2024-10-22 ENCOUNTER — TELEPHONE (OUTPATIENT)
Dept: UROLOGY | Facility: CLINIC | Age: 74
End: 2024-10-22
Payer: MEDICARE

## 2024-10-22 DIAGNOSIS — N47.8 FORESKIN PROBLEM: Primary | ICD-10-CM

## 2024-10-22 NOTE — TELEPHONE ENCOUNTER
Caller: DIDIER THORNE    Relationship: SELF    Best call back number: [phone]    What is the best time to reach you: ANYTIME    Who are you requesting to speak with (clinical staff, provider,  specific staff member): CLINICAL STAFF    Do you know the name of the person who called: N/A    What was the call regarding: PT IS HAVING ISSUES WITH HIS FORESKIN, SAYS IT IS SHRUNK AND DRIED OUT TO WHERE HE CAN'T PULL IT BACK. PT SAID HIS FREQUENCY AND PROSTATE ISSUES ARE WORSE THAN EVER.    Is it okay if the provider responds through MyChart: N/A

## 2024-10-23 RX ORDER — NYSTATIN AND TRIAMCINOLONE ACETONIDE 100000; 1 [USP'U]/G; MG/G
OINTMENT TOPICAL
Qty: 30 G | Refills: 1 | Status: SHIPPED | OUTPATIENT
Start: 2024-10-23
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
## 2024-10-23 NOTE — TELEPHONE ENCOUNTER
"Spoke to patient.  Sometimes the dried skin is cracked and bleeding.  Three different home health nurses have looked at it.  One gave him a cream \"barrier protection cream\".  Patient states it does not work.  He also states that his urinary frequency and urgency have gotten worse.  Patient would like an appt with Dr Leroy.  Advised patient I will see when we can get him scheduled and give him a call back.  Patient verbalized understanding.  "

## 2024-11-18 PROBLEM — N47.1 PHIMOSIS: Status: ACTIVE | Noted: 2024-11-18

## 2024-11-18 NOTE — PROGRESS NOTES
Chief Complaint    Urologic complaint    Subjective          Jake Alvarado presents to [facility] UROLOGY  History of Present Illness              75 yo WM        BPH w  LUTS   h/o microhematuria  Phimosis        10/23/2024 issues with foreskin cracking and bleeding.  Also having worse frequency - recommended triamcinolone/nystatin cream twice daily to clean dry foreskin    Can not retract foreskin. bothersome      10/24 patient had UTI and also balanitis was treated by [place] [facility]      6/24 1.3, GFR 56    5/24 patient admitted in ICU with COVID-pneumonia.  Spent several months in rehab      Urinary symptoms little worse in the last year.  He does have some urgency with leakage at times.  No pads.      2022 finasteride for 1 year before, did not make a difference when he stopped this.      Worsening urgency/incontinence in the last year.  Wearing depends  Nocturia 4-5  weak stream sometimes.       2-4 cups of caffeinated coffee daily.  Some alcohol in the evening    COPD  -  A fib. No CAD,  smokes.  Xarelto and aspirin 81.  Followed by pulmonary    Not worried about ED        PVR     12/24   012  12/23  107  11/22  170  6/21    144  2/ 20   70      Previous    10/1/2021 CT urogram-prostatomegaly, diverticulosis, recommend colonoscopy, no  findings, severe arthrosclerosis in the abdominal aorta and iliac vascular distributions.  AAA measuring 3 cm in transverse dimension.  Delayed phase okay    8/21 cystoscopy-2.5 cm prostate, moderate trabeculations with some very shallow diverticulum posteriorly on the dome.  Negative otherwise.      doxazosin -   Could not really tell any difference when he came off his medication. Had side effects.  Patient does drink a lot of fluid daily, he also drinks some alcohol in the evening.        Flomax - did not help  tolteradine - did not help    No history of kidney stone.    uncle with prostate CA,   Has never had any urologic surgery.      12/19  creatinine 1.0, GFR greater than 60    PSA    3/21  1.1  11/18 1.3  5/17 1.0          Past History:  Medical History: has a past medical history of Allergic reaction to alpha-gal, Arthritis, Asthma, Atrial fibrillation, Bladder disorder, Chronic allergic rhinitis, Condition not found, COPD (chronic obstructive pulmonary disease), GERD (gastroesophageal reflux disease), Heart disease, High blood pressure, High cholesterol, Lung disease, and Stroke.   Surgical History: has a past surgical history that includes Back surgery; Colon surgery (N/A); Carotid Endarterectomy (Right); Tonsillectomy; Eye surgery (Right); Colonoscopy; Colonoscopy (N/A, 01/11/2023); Esophagogastroduodenoscopy; Esophagogastroduodenoscopy (N/A, 1/10/2024); and Colonoscopy (N/A, 1/10/2024).   Family History: family history includes Diabetes in his brother, mother, and sister; No Known Problems in his father; Prostate cancer in his maternal uncle.   Social History: reports that he has been smoking cigarettes. He started smoking about 59 years ago. He has a 59.9 pack-year smoking history. He has never used smokeless tobacco. He reports current alcohol use. Drug use questions deferred to the physician.  Allergies: Patient has no known allergies.              Assessment and Plan    Diagnoses and all orders for this visit:    1. Phimosis (Primary)    2. Benign prostatic hyperplasia with lower urinary tract symptoms, symptom details unspecified        Phimosis    Bothersome.  cream did not help.  Circumcision versus dorsal slit at this time after discussion patient would like dorsal slit.  Risks and benefits were discussed including bleeding, infection and damage to the urinary system.  We also discussed the risk of anesthesia up to and including death.  Patient voiced understanding and would like to proceed.    Cardiology clearance      I will also do cystoscopy at the same time for underlying pathology as his frequency has gotten worse    Hold Xarelto x  3-day          BPH/ with urgency/frequency       Bothering patient the most    Increase Flomax 0.4 mg p.o. twice daily.   Risk and benefits discussed.          We did discuss Rezum and TURP today.  At this time he is going to hold off on procedures      Follow-up in 1 year    PVR at   f/u

## 2024-11-22 ENCOUNTER — OFFICE VISIT (OUTPATIENT)
Dept: UROLOGY | Age: 74
End: 2024-11-22
Payer: MEDICARE

## 2024-11-22 ENCOUNTER — PREP FOR SURGERY (OUTPATIENT)
Dept: OTHER | Facility: HOSPITAL | Age: 74
End: 2024-11-22
Payer: MEDICARE

## 2024-11-22 VITALS — BODY MASS INDEX: 26.66 KG/M2 | RESPIRATION RATE: 20 BRPM | WEIGHT: 160 LBS | HEIGHT: 65 IN

## 2024-11-22 DIAGNOSIS — N47.1 PHIMOSIS: Primary | ICD-10-CM

## 2024-11-22 DIAGNOSIS — N40.1 BENIGN PROSTATIC HYPERPLASIA WITH LOWER URINARY TRACT SYMPTOMS, SYMPTOM DETAILS UNSPECIFIED: Primary | ICD-10-CM

## 2024-11-22 DIAGNOSIS — N47.1 PHIMOSIS: ICD-10-CM

## 2024-11-22 LAB
BILIRUB BLD-MCNC: NEGATIVE MG/DL
CLARITY, POC: CLEAR
COLOR UR: YELLOW
EXPIRATION DATE: ABNORMAL
GLUCOSE UR STRIP-MCNC: ABNORMAL MG/DL
KETONES UR QL: NEGATIVE
LEUKOCYTE EST, POC: ABNORMAL
Lab: ABNORMAL
NITRITE UR-MCNC: NEGATIVE MG/ML
PH UR: 6 [PH] (ref 5–8)
PROT UR STRIP-MCNC: ABNORMAL MG/DL
RBC # UR STRIP: ABNORMAL /UL
SP GR UR: 1.02 (ref 1–1.03)
URINE VOLUME: 12
UROBILINOGEN UR QL: ABNORMAL

## 2024-11-22 RX ORDER — SODIUM CHLORIDE 9 MG/ML
40 INJECTION, SOLUTION INTRAVENOUS AS NEEDED
OUTPATIENT
Start: 2024-11-22

## 2024-11-22 RX ORDER — TAMSULOSIN HYDROCHLORIDE 0.4 MG/1
1 CAPSULE ORAL 2 TIMES DAILY
Qty: 60 CAPSULE | Refills: 11 | Status: SHIPPED | OUTPATIENT
Start: 2024-11-22

## 2024-11-22 RX ORDER — SODIUM CHLORIDE 0.9 % (FLUSH) 0.9 %
3 SYRINGE (ML) INJECTION EVERY 12 HOURS SCHEDULED
OUTPATIENT
Start: 2024-11-22

## 2024-11-22 RX ORDER — TAMSULOSIN HYDROCHLORIDE 0.4 MG/1
1 CAPSULE ORAL 2 TIMES DAILY
Qty: 30 CAPSULE | Refills: 11 | Status: SHIPPED | OUTPATIENT
Start: 2024-11-22 | End: 2024-11-22

## 2024-11-22 RX ORDER — SODIUM CHLORIDE 0.9 % (FLUSH) 0.9 %
10 SYRINGE (ML) INJECTION AS NEEDED
OUTPATIENT
Start: 2024-11-22

## 2024-11-22 RX ORDER — TAMSULOSIN HYDROCHLORIDE 0.4 MG/1
1 CAPSULE ORAL DAILY
COMMUNITY
End: 2024-11-22 | Stop reason: SDUPTHER

## 2024-11-27 ENCOUNTER — TELEPHONE (OUTPATIENT)
Dept: UROLOGY | Age: 74
End: 2024-11-27
Payer: MEDICARE

## 2024-11-27 NOTE — TELEPHONE ENCOUNTER
Called to ask patient who he sees for cardiology. Patient stated that he sees [facility] and he also wants to cancel his procedure that is scheduled. Will get this cancel. Patient will call back when he wants to reschedule.

## 2025-03-25 ENCOUNTER — TRANSCRIBE ORDERS (OUTPATIENT)
Dept: ADMINISTRATIVE | Facility: HOSPITAL | Age: 75
End: 2025-03-25
Payer: MEDICARE

## 2025-03-25 DIAGNOSIS — R04.2 HEMOPTYSIS: Primary | ICD-10-CM

## 2025-03-31 ENCOUNTER — HOSPITAL ENCOUNTER (OUTPATIENT)
Dept: CT IMAGING | Facility: HOSPITAL | Age: 75
Discharge: HOME | End: 2025-03-31
Admitting: FAMILY MEDICINE
Payer: MEDICARE

## 2025-03-31 DIAGNOSIS — R04.2 HEMOPTYSIS: ICD-10-CM

## 2025-03-31 PROCEDURE — 71250 CT THORAX DX C-: CPT

## (undated) DEVICE — SOL IRRG H2O PL/BG 1000ML STRL

## (undated) DEVICE — THE SINGLE USE ETRAP – POLYP TRAP IS USED FOR SUCTION RETRIEVAL OF ENDOSCOPICALLY REMOVED POLYPS.: Brand: ETRAP

## (undated) DEVICE — PAD GRND REM POLYHESIVE A/ DISP

## (undated) DEVICE — Device: Brand: DEFENDO AIR/WATER/SUCTION AND BIOPSY VALVE

## (undated) DEVICE — BLCK/BITE BLOX WO/DENTL/RIM W/STRAP 54F

## (undated) DEVICE — Device

## (undated) DEVICE — SINGLE-USE BIOPSY FORCEPS: Brand: RADIAL JAW 4

## (undated) DEVICE — LINER SURG CANSTR SXN S/RIGD 1500CC

## (undated) DEVICE — CONN JET HYDRA H20 AUXILIARY DISP

## (undated) DEVICE — SOL IRR NACL 0.9PCT BT 1000ML

## (undated) DEVICE — SOLIDIFIER LIQLOC PLS 1500CC BT

## (undated) DEVICE — GLV SURG BIOGEL LTX PF 7 1/2

## (undated) DEVICE — SNAR E/S POLYP SNAREMASTER OVL/10MM 2.8X2300MM YEL